# Patient Record
Sex: FEMALE | Race: BLACK OR AFRICAN AMERICAN | Employment: FULL TIME | URBAN - METROPOLITAN AREA
[De-identification: names, ages, dates, MRNs, and addresses within clinical notes are randomized per-mention and may not be internally consistent; named-entity substitution may affect disease eponyms.]

---

## 2016-06-07 LAB
ANTIBODY: NON REACTIVE
CHOLESTEROL, TOTAL: 139 MG/DL
CHOLESTEROL/HDL RATIO: 2.6
HDLC SERPL-MCNC: 54 MG/DL (ref 35–70)
LDL CHOLESTEROL CALCULATED: 71 MG/DL (ref 0–160)
TRIGL SERPL-MCNC: 70 MG/DL
VLDLC SERPL CALC-MCNC: NORMAL MG/DL

## 2016-11-17 LAB — HBA1C MFR BLD: 6.7 %

## 2017-01-04 ENCOUNTER — HOSPITAL ENCOUNTER (OUTPATIENT)
Dept: PHYSICAL THERAPY | Age: 57
Discharge: HOME | End: 2017-01-04
Admitting: NURSE PRACTITIONER

## 2017-01-06 ENCOUNTER — HOSPITAL ENCOUNTER (OUTPATIENT)
Dept: PHYSICAL THERAPY | Age: 57
Discharge: HOME | End: 2017-01-06
Admitting: NURSE PRACTITIONER

## 2017-01-10 ENCOUNTER — HOSPITAL ENCOUNTER (OUTPATIENT)
Dept: PHYSICAL THERAPY | Age: 57
Discharge: HOME | End: 2017-01-10
Admitting: NURSE PRACTITIONER

## 2017-01-11 DIAGNOSIS — I10 ESSENTIAL HYPERTENSION: Chronic | ICD-10-CM

## 2017-01-11 RX ORDER — LISINOPRIL 20 MG/1
TABLET ORAL
Qty: 180 TABLET | Refills: 0 | Status: SHIPPED | OUTPATIENT
Start: 2017-01-11 | End: 2017-04-17 | Stop reason: SDUPTHER

## 2017-01-17 ENCOUNTER — HOSPITAL ENCOUNTER (OUTPATIENT)
Dept: PHYSICAL THERAPY | Age: 57
Discharge: HOME | End: 2017-01-17
Admitting: NURSE PRACTITIONER

## 2017-01-19 ENCOUNTER — HOSPITAL ENCOUNTER (OUTPATIENT)
Dept: PHYSICAL THERAPY | Age: 57
Discharge: HOME | End: 2017-01-19
Admitting: NURSE PRACTITIONER

## 2017-01-24 ENCOUNTER — OFFICE VISIT (OUTPATIENT)
Dept: FAMILY MEDICINE CLINIC | Age: 57
End: 2017-01-24

## 2017-01-24 VITALS
OXYGEN SATURATION: 97 % | TEMPERATURE: 99.1 F | HEART RATE: 80 BPM | WEIGHT: 138 LBS | SYSTOLIC BLOOD PRESSURE: 120 MMHG | DIASTOLIC BLOOD PRESSURE: 78 MMHG | BODY MASS INDEX: 24.45 KG/M2 | HEIGHT: 63 IN

## 2017-01-24 DIAGNOSIS — N18.30 CKD (CHRONIC KIDNEY DISEASE) STAGE 3, GFR 30-59 ML/MIN (HCC): ICD-10-CM

## 2017-01-24 DIAGNOSIS — E78.00 PURE HYPERCHOLESTEROLEMIA: ICD-10-CM

## 2017-01-24 DIAGNOSIS — I10 ESSENTIAL HYPERTENSION: Primary | Chronic | ICD-10-CM

## 2017-01-24 DIAGNOSIS — N95.1 MENOPAUSAL STATE: ICD-10-CM

## 2017-01-24 DIAGNOSIS — Z23 NEEDS FLU SHOT: ICD-10-CM

## 2017-01-24 PROCEDURE — 90630 INFLUENZA, QUADRIVALENT, INTRADERMAL, PF (FLUZONE QUADRIVALENT PF ID): CPT | Performed by: FAMILY MEDICINE

## 2017-01-24 PROCEDURE — 99214 OFFICE O/P EST MOD 30 MIN: CPT | Performed by: FAMILY MEDICINE

## 2017-01-24 PROCEDURE — 90471 IMMUNIZATION ADMIN: CPT | Performed by: FAMILY MEDICINE

## 2017-01-25 ASSESSMENT — ENCOUNTER SYMPTOMS: COUGH: 0

## 2017-02-02 DIAGNOSIS — I10 ESSENTIAL HYPERTENSION: Chronic | ICD-10-CM

## 2017-02-02 RX ORDER — AMLODIPINE BESYLATE 5 MG/1
TABLET ORAL
Qty: 90 TABLET | Refills: 1 | Status: SHIPPED | OUTPATIENT
Start: 2017-02-02 | End: 2017-08-11 | Stop reason: DRUGHIGH

## 2017-03-20 RX ORDER — CARVEDILOL 12.5 MG/1
TABLET ORAL
Qty: 180 TABLET | Refills: 0 | Status: SHIPPED | OUTPATIENT
Start: 2017-03-20 | End: 2017-06-25 | Stop reason: SDUPTHER

## 2017-04-17 DIAGNOSIS — I10 ESSENTIAL HYPERTENSION: Chronic | ICD-10-CM

## 2017-04-17 RX ORDER — LISINOPRIL 20 MG/1
TABLET ORAL
Qty: 180 TABLET | Refills: 0 | Status: SHIPPED | OUTPATIENT
Start: 2017-04-17 | End: 2017-07-21 | Stop reason: SDUPTHER

## 2017-06-19 ENCOUNTER — OFFICE VISIT (OUTPATIENT)
Dept: FAMILY MEDICINE CLINIC | Age: 57
End: 2017-06-19

## 2017-06-19 VITALS
SYSTOLIC BLOOD PRESSURE: 110 MMHG | DIASTOLIC BLOOD PRESSURE: 70 MMHG | WEIGHT: 142 LBS | BODY MASS INDEX: 25.56 KG/M2 | TEMPERATURE: 99.6 F | HEART RATE: 78 BPM | OXYGEN SATURATION: 98 %

## 2017-06-19 DIAGNOSIS — J02.9 PHARYNGITIS, UNSPECIFIED ETIOLOGY: Primary | ICD-10-CM

## 2017-06-19 DIAGNOSIS — J01.10 ACUTE NON-RECURRENT FRONTAL SINUSITIS: ICD-10-CM

## 2017-06-19 LAB — S PYO AG THROAT QL: NORMAL

## 2017-06-19 PROCEDURE — 99214 OFFICE O/P EST MOD 30 MIN: CPT | Performed by: NURSE PRACTITIONER

## 2017-06-19 PROCEDURE — 87880 STREP A ASSAY W/OPTIC: CPT | Performed by: NURSE PRACTITIONER

## 2017-06-19 RX ORDER — AMOXICILLIN AND CLAVULANATE POTASSIUM 875; 125 MG/1; MG/1
1 TABLET, FILM COATED ORAL 2 TIMES DAILY
Qty: 20 TABLET | Refills: 0 | Status: SHIPPED | OUTPATIENT
Start: 2017-06-19 | End: 2017-06-29

## 2017-06-19 ASSESSMENT — ENCOUNTER SYMPTOMS
SHORTNESS OF BREATH: 1
SORE THROAT: 1
COUGH: 1

## 2017-06-26 RX ORDER — CARVEDILOL 12.5 MG/1
TABLET ORAL
Qty: 180 TABLET | Refills: 0 | Status: SHIPPED | OUTPATIENT
Start: 2017-06-26 | End: 2017-09-25 | Stop reason: SDUPTHER

## 2017-07-18 ENCOUNTER — TELEPHONE (OUTPATIENT)
Dept: FAMILY MEDICINE CLINIC | Age: 57
End: 2017-07-18

## 2017-07-18 DIAGNOSIS — G93.89 BRAIN MASS: Primary | ICD-10-CM

## 2017-08-11 ENCOUNTER — OFFICE VISIT (OUTPATIENT)
Dept: FAMILY MEDICINE CLINIC | Age: 57
End: 2017-08-11

## 2017-08-11 VITALS
SYSTOLIC BLOOD PRESSURE: 122 MMHG | HEART RATE: 75 BPM | WEIGHT: 142 LBS | HEIGHT: 63 IN | TEMPERATURE: 98.3 F | OXYGEN SATURATION: 97 % | BODY MASS INDEX: 25.16 KG/M2 | DIASTOLIC BLOOD PRESSURE: 76 MMHG

## 2017-08-11 DIAGNOSIS — I10 ESSENTIAL HYPERTENSION: Chronic | ICD-10-CM

## 2017-08-11 DIAGNOSIS — N18.30 CKD (CHRONIC KIDNEY DISEASE) STAGE 3, GFR 30-59 ML/MIN (HCC): ICD-10-CM

## 2017-08-11 DIAGNOSIS — E11.29 PERSISTENT PROTEINURIA ASSOCIATED WITH TYPE 2 DIABETES MELLITUS (HCC): Primary | ICD-10-CM

## 2017-08-11 DIAGNOSIS — R80.9 PERSISTENT PROTEINURIA ASSOCIATED WITH TYPE 2 DIABETES MELLITUS (HCC): Primary | ICD-10-CM

## 2017-08-11 DIAGNOSIS — M67.911 TENDINOPATHY OF RIGHT SHOULDER: ICD-10-CM

## 2017-08-11 LAB — HBA1C MFR BLD: 6.6 %

## 2017-08-11 PROCEDURE — 99214 OFFICE O/P EST MOD 30 MIN: CPT | Performed by: FAMILY MEDICINE

## 2017-08-11 PROCEDURE — 83036 HEMOGLOBIN GLYCOSYLATED A1C: CPT | Performed by: FAMILY MEDICINE

## 2017-08-11 RX ORDER — AMLODIPINE BESYLATE 10 MG/1
10 TABLET ORAL DAILY
COMMUNITY

## 2017-08-21 ENCOUNTER — HOSPITAL ENCOUNTER (OUTPATIENT)
Dept: PHYSICAL THERAPY | Age: 57
Discharge: HOME | End: 2017-08-31
Admitting: FAMILY MEDICINE

## 2017-08-23 ENCOUNTER — HOSPITAL ENCOUNTER (OUTPATIENT)
Dept: PHYSICAL THERAPY | Age: 57
Discharge: HOME | End: 2017-08-23
Admitting: FAMILY MEDICINE

## 2017-08-28 ENCOUNTER — HOSPITAL ENCOUNTER (OUTPATIENT)
Dept: PHYSICAL THERAPY | Age: 57
Discharge: HOME | End: 2017-08-28
Admitting: FAMILY MEDICINE

## 2017-08-30 ENCOUNTER — HOSPITAL ENCOUNTER (OUTPATIENT)
Dept: PHYSICAL THERAPY | Age: 57
Discharge: HOME | End: 2017-08-30
Admitting: FAMILY MEDICINE

## 2017-09-07 ENCOUNTER — OFFICE VISIT (OUTPATIENT)
Dept: ORTHOPEDIC SURGERY | Age: 57
End: 2017-09-07

## 2017-09-07 VITALS — WEIGHT: 142 LBS | BODY MASS INDEX: 25.16 KG/M2 | HEIGHT: 63 IN

## 2017-09-07 DIAGNOSIS — M25.511 CHRONIC RIGHT SHOULDER PAIN: Primary | ICD-10-CM

## 2017-09-07 DIAGNOSIS — M54.2 NECK PAIN: ICD-10-CM

## 2017-09-07 DIAGNOSIS — G89.29 CHRONIC RIGHT SHOULDER PAIN: Primary | ICD-10-CM

## 2017-09-07 PROCEDURE — 72020 X-RAY EXAM OF SPINE 1 VIEW: CPT | Performed by: ORTHOPAEDIC SURGERY

## 2017-09-07 PROCEDURE — 73030 X-RAY EXAM OF SHOULDER: CPT | Performed by: ORTHOPAEDIC SURGERY

## 2017-09-07 PROCEDURE — 99204 OFFICE O/P NEW MOD 45 MIN: CPT | Performed by: ORTHOPAEDIC SURGERY

## 2017-09-08 ENCOUNTER — HOSPITAL ENCOUNTER (OUTPATIENT)
Dept: PHYSICAL THERAPY | Age: 57
Discharge: HOME | End: 2017-09-08
Admitting: FAMILY MEDICINE

## 2017-09-14 ENCOUNTER — OFFICE VISIT (OUTPATIENT)
Dept: ORTHOPEDIC SURGERY | Age: 57
End: 2017-09-14

## 2017-09-14 VITALS — WEIGHT: 142 LBS | BODY MASS INDEX: 25.16 KG/M2 | HEIGHT: 63 IN

## 2017-09-14 DIAGNOSIS — G89.29 CHRONIC RIGHT SHOULDER PAIN: Primary | ICD-10-CM

## 2017-09-14 DIAGNOSIS — M25.511 CHRONIC RIGHT SHOULDER PAIN: Primary | ICD-10-CM

## 2017-09-14 PROCEDURE — 99213 OFFICE O/P EST LOW 20 MIN: CPT | Performed by: ORTHOPAEDIC SURGERY

## 2017-09-14 RX ORDER — PREDNISONE 10 MG/1
TABLET ORAL
Qty: 30 TABLET | Refills: 0 | Status: SHIPPED | OUTPATIENT
Start: 2017-09-14 | End: 2017-11-10 | Stop reason: ALTCHOICE

## 2017-09-25 RX ORDER — CARVEDILOL 12.5 MG/1
TABLET ORAL
Qty: 180 TABLET | Refills: 0 | Status: SHIPPED | OUTPATIENT
Start: 2017-09-25 | End: 2017-12-31 | Stop reason: SDUPTHER

## 2017-10-09 ENCOUNTER — TELEPHONE (OUTPATIENT)
Dept: FAMILY MEDICINE CLINIC | Age: 57
End: 2017-10-09
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
## 2017-10-13 ENCOUNTER — OFFICE VISIT (OUTPATIENT)
Dept: FAMILY MEDICINE CLINIC | Age: 57
End: 2017-10-13

## 2017-10-13 VITALS
HEART RATE: 75 BPM | OXYGEN SATURATION: 99 % | BODY MASS INDEX: 25.56 KG/M2 | DIASTOLIC BLOOD PRESSURE: 70 MMHG | WEIGHT: 142 LBS | SYSTOLIC BLOOD PRESSURE: 110 MMHG

## 2017-10-13 DIAGNOSIS — Z23 NEED FOR INFLUENZA VACCINATION: ICD-10-CM

## 2017-10-13 DIAGNOSIS — S06.0X0D CONCUSSION, WITHOUT LOC, SUBSEQUENT ENCOUNTER: Primary | ICD-10-CM
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
## 2017-10-13 PROCEDURE — 90471 IMMUNIZATION ADMIN: CPT | Performed by: FAMILY MEDICINE

## 2017-10-13 PROCEDURE — 90630 INFLUENZA, QUADV, 18-64 YRS, ID, PF, MICRO INJ, 0.1ML (FLUZONE QUADV, PF): CPT | Performed by: FAMILY MEDICINE

## 2017-10-13 PROCEDURE — 99214 OFFICE O/P EST MOD 30 MIN: CPT | Performed by: FAMILY MEDICINE

## 2017-10-13 ASSESSMENT — ENCOUNTER SYMPTOMS: VOMITING: 0
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
## 2017-10-22 DIAGNOSIS — I10 ESSENTIAL HYPERTENSION: Chronic | ICD-10-CM

## 2017-10-22 RX ORDER — LISINOPRIL 20 MG/1
TABLET ORAL
Qty: 180 TABLET | Refills: 0 | Status: SHIPPED | OUTPATIENT
Start: 2017-10-22 | End: 2018-01-20 | Stop reason: SDUPTHER

## 2017-11-07 ENCOUNTER — TELEPHONE (OUTPATIENT)
Dept: ORTHOPEDIC SURGERY | Age: 57
End: 2017-11-07

## 2017-11-09 DIAGNOSIS — M19.211 SECONDARY OSTEOARTHRITIS OF RIGHT SHOULDER: ICD-10-CM

## 2017-11-09 DIAGNOSIS — M75.41 IMPINGEMENT SYNDROME OF RIGHT SHOULDER: Primary | ICD-10-CM

## 2017-11-09 RX ORDER — OXYCODONE HYDROCHLORIDE 10 MG/1
10 TABLET ORAL EVERY 8 HOURS PRN
Qty: 40 TABLET | Refills: 0 | Status: SHIPPED | OUTPATIENT
Start: 2017-11-09 | End: 2017-11-16

## 2017-11-14 ENCOUNTER — HOSPITAL ENCOUNTER (OUTPATIENT)
Dept: SURGERY | Age: 57
Discharge: HOME | End: 2017-11-14
Attending: ORTHOPAEDIC SURGERY | Admitting: ORTHOPAEDIC SURGERY

## 2017-11-14 VITALS
RESPIRATION RATE: 16 BRPM | BODY MASS INDEX: 26.5 KG/M2 | WEIGHT: 144 LBS | SYSTOLIC BLOOD PRESSURE: 117 MMHG | HEIGHT: 62 IN | HEART RATE: 81 BPM | TEMPERATURE: 98 F | DIASTOLIC BLOOD PRESSURE: 80 MMHG | OXYGEN SATURATION: 100 %

## 2017-11-14 LAB
ANION GAP SERPL CALCULATED.3IONS-SCNC: 13 MMOL/L (ref 3–16)
BUN BLDV-MCNC: 33 MG/DL (ref 7–20)
CALCIUM SERPL-MCNC: 10 MG/DL (ref 8.3–10.6)
CHLORIDE BLD-SCNC: 102 MMOL/L (ref 99–110)
CO2: 24 MMOL/L (ref 21–32)
CREAT SERPL-MCNC: 1.8 MG/DL (ref 0.6–1.1)
GFR AFRICAN AMERICAN: 35
GFR NON-AFRICAN AMERICAN: 29
GLUCOSE BLD-MCNC: 125 MG/DL (ref 70–99)
GLUCOSE BLD-MCNC: 132 MG/DL (ref 70–99)
GLUCOSE BLD-MCNC: 162 MG/DL (ref 70–99)
HCT VFR BLD CALC: 27.2 % (ref 36–48)
HEMOGLOBIN: 8.6 G/DL (ref 12–16)
MCH RBC QN AUTO: 27.7 PG (ref 26–34)
MCHC RBC AUTO-ENTMCNC: 31.6 G/DL (ref 31–36)
MCV RBC AUTO: 87.8 FL (ref 80–100)
PDW BLD-RTO: 13.6 % (ref 12.4–15.4)
PERFORMED ON: ABNORMAL
PERFORMED ON: ABNORMAL
PLATELET # BLD: 166 K/UL (ref 135–450)
PMV BLD AUTO: 8.3 FL (ref 5–10.5)
POTASSIUM SERPL-SCNC: 5 MMOL/L (ref 3.5–5.1)
RBC # BLD: 3.1 M/UL (ref 4–5.2)
SODIUM BLD-SCNC: 139 MMOL/L (ref 136–145)
WBC # BLD: 6.3 K/UL (ref 4–11)

## 2017-11-14 RX ORDER — MEPERIDINE HYDROCHLORIDE 25 MG/ML
12.5 INJECTION INTRAMUSCULAR; INTRAVENOUS; SUBCUTANEOUS EVERY 5 MIN PRN
Status: DISCONTINUED | OUTPATIENT
Start: 2017-11-14 | End: 2017-11-15 | Stop reason: HOSPADM

## 2017-11-14 RX ORDER — CEPHALEXIN 500 MG/1
500 CAPSULE ORAL 3 TIMES DAILY
Qty: 21 CAPSULE | Refills: 1 | Status: SHIPPED | OUTPATIENT
Start: 2017-11-14 | End: 2017-11-21

## 2017-11-14 RX ORDER — ONDANSETRON 2 MG/ML
4 INJECTION INTRAMUSCULAR; INTRAVENOUS
Status: COMPLETED | OUTPATIENT
Start: 2017-11-14 | End: 2017-11-14

## 2017-11-14 RX ORDER — SODIUM CHLORIDE 0.9 % (FLUSH) 0.9 %
SYRINGE (ML) INJECTION
Status: DISPENSED
Start: 2017-11-14 | End: 2017-11-14

## 2017-11-14 RX ORDER — FENTANYL CITRATE 50 UG/ML
25 INJECTION, SOLUTION INTRAMUSCULAR; INTRAVENOUS EVERY 5 MIN PRN
Status: DISCONTINUED | OUTPATIENT
Start: 2017-11-14 | End: 2017-11-15 | Stop reason: HOSPADM

## 2017-11-14 RX ORDER — SODIUM CHLORIDE 9 MG/ML
INJECTION, SOLUTION INTRAVENOUS CONTINUOUS
Status: DISCONTINUED | OUTPATIENT
Start: 2017-11-14 | End: 2017-11-15 | Stop reason: HOSPADM

## 2017-11-14 RX ORDER — LABETALOL HYDROCHLORIDE 5 MG/ML
5 INJECTION, SOLUTION INTRAVENOUS EVERY 10 MIN PRN
Status: DISCONTINUED | OUTPATIENT
Start: 2017-11-14 | End: 2017-11-15 | Stop reason: HOSPADM

## 2017-11-14 RX ORDER — CEFAZOLIN SODIUM 2 G/100ML
2 INJECTION, SOLUTION INTRAVENOUS
Status: COMPLETED | OUTPATIENT
Start: 2017-11-14 | End: 2017-11-14

## 2017-11-14 RX ORDER — PROMETHAZINE HYDROCHLORIDE 25 MG/ML
6.25 INJECTION, SOLUTION INTRAMUSCULAR; INTRAVENOUS
Status: COMPLETED | OUTPATIENT
Start: 2017-11-14 | End: 2017-11-14

## 2017-11-14 RX ORDER — FENTANYL CITRATE 50 UG/ML
50 INJECTION, SOLUTION INTRAMUSCULAR; INTRAVENOUS EVERY 5 MIN PRN
Status: DISCONTINUED | OUTPATIENT
Start: 2017-11-14 | End: 2017-11-15 | Stop reason: HOSPADM

## 2017-11-14 RX ORDER — ACETAMINOPHEN 325 MG/1
650 TABLET ORAL EVERY 4 HOURS PRN
Status: DISCONTINUED | OUTPATIENT
Start: 2017-11-14 | End: 2017-11-15 | Stop reason: HOSPADM

## 2017-11-14 RX ADMIN — CEFAZOLIN SODIUM 2 G: 2 INJECTION, SOLUTION INTRAVENOUS at 07:01

## 2017-11-14 RX ADMIN — ONDANSETRON 4 MG: 2 INJECTION INTRAMUSCULAR; INTRAVENOUS at 09:19

## 2017-11-14 RX ADMIN — SODIUM CHLORIDE: 9 INJECTION, SOLUTION INTRAVENOUS at 06:59

## 2017-11-14 RX ADMIN — FENTANYL CITRATE 50 MCG: 50 INJECTION, SOLUTION INTRAMUSCULAR; INTRAVENOUS at 08:53

## 2017-11-14 RX ADMIN — FENTANYL CITRATE 25 MCG: 50 INJECTION, SOLUTION INTRAMUSCULAR; INTRAVENOUS at 09:14

## 2017-11-14 RX ADMIN — PROMETHAZINE HYDROCHLORIDE 6.25 MG: 25 INJECTION, SOLUTION INTRAMUSCULAR; INTRAVENOUS at 09:45

## 2017-11-14 ASSESSMENT — PAIN DESCRIPTION - PAIN TYPE
TYPE: SURGICAL PAIN

## 2017-11-14 ASSESSMENT — PAIN SCALES - GENERAL
PAINLEVEL_OUTOF10: 4
PAINLEVEL_OUTOF10: 6
PAINLEVEL_OUTOF10: 8

## 2017-11-14 ASSESSMENT — PAIN DESCRIPTION - ORIENTATION
ORIENTATION: RIGHT
ORIENTATION: RIGHT

## 2017-11-14 ASSESSMENT — PAIN DESCRIPTION - LOCATION
LOCATION: SHOULDER
LOCATION: SHOULDER

## 2017-11-14 ASSESSMENT — PAIN - FUNCTIONAL ASSESSMENT: PAIN_FUNCTIONAL_ASSESSMENT: 0-10

## 2017-11-14 NOTE — ANESTHESIA PRE-OP
BY MOUTH TWICE A DAY 17   Doris Fam MD   amLODIPine (NORVASC) 10 MG tablet Take 10 mg by mouth daily    Historical Provider, MD   Blood Pressure Monitoring QUARTERMAIN) MISC Use daily as needed 17   Doris Fam MD   Dulaglutide (TRULICITY) 8.39 DK/0.0GI SOPN Inject into the skin    Historical Provider, MD   atorvastatin (LIPITOR) 10 MG tablet Take 10 mg by mouth daily    Historical Provider, MD   vitamin D (CHOLECALCIFEROL) 1000 UNITS TABS tablet Take 1,000 Units by mouth daily    Historical Provider, MD   Insulin Glargine (TOUJEO SOLOSTAR) 300 UNIT/ML SOPN Inject 20 Units into the skin     Historical Provider, MD   SOFT TOUCH LANCETS MISC 1 Stick by Does not apply route 2 times daily as needed. 12   Doris Fam MD       Current Outpatient Prescriptions   Medication Sig Dispense Refill    oxyCODONE HCl (OXY-IR) 10 MG immediate release tablet Take 1 tablet by mouth every 8 hours as needed for Pain . 40 tablet 0    lisinopril (PRINIVIL;ZESTRIL) 20 MG tablet TAKE ONE TABLET BY MOUTH TWICE A  tablet 0    Prenatal Multivit-Min-Fe-FA (PRE- FORMULA PO) Take by mouth      carvedilol (COREG) 12.5 MG tablet TAKE ONE TABLET BY MOUTH TWICE A  tablet 0    amLODIPine (NORVASC) 10 MG tablet Take 10 mg by mouth daily      Blood Pressure Monitoring (SPHYGMOMANOMETER) MISC Use daily as needed 1 each 0    Dulaglutide (TRULICITY) 8.05 HL/7.7VT SOPN Inject into the skin      atorvastatin (LIPITOR) 10 MG tablet Take 10 mg by mouth daily      vitamin D (CHOLECALCIFEROL) 1000 UNITS TABS tablet Take 1,000 Units by mouth daily      Insulin Glargine (TOUJEO SOLOSTAR) 300 UNIT/ML SOPN Inject 20 Units into the skin       SOFT TOUCH LANCETS MISC 1 Stick by Does not apply route 2 times daily as needed.  100 each 5     Current Facility-Administered Medications   Medication Dose Route Frequency Provider Last Rate Last Dose    ceFAZolin (ANCEF) 2 g in dextrose 4 % 100 mL IVPB (premix)  2 g administered. Anesthetic plan and risks discussed with patient. Plan discussed with CRNA. DOS STAFF ADDENDUM:    Pt seen and examined, chart reviewed (including anesthesia, drug and allergy history). No interval changes to history and physical examination. Anesthetic plan, risks, benefits, alternatives, and personnel involved discussed with patient. Patient verbalized an understanding and agrees to proceed.       Oleg Umanzor MD  November 14, 2017  6:45 AM

## 2017-11-14 NOTE — PROGRESS NOTES
Received from or - unresponsive,oral airway,nasal cannula,right shoulder dressing dry and intact,circulation good,ice pack placed,elevated,jeri,maria isabel lyon,vss.

## 2017-11-14 NOTE — OP NOTE
put on the operative table in the supine position. Following the patient's general anesthesia. The patient was sat up in the beachchair positioner. A pillow was put under the knees and taped in place the flank pads were tightened and a nonoperative arm pillow was applied. The neck and head positioner was carefully adjusted and tightened for optimal safety. At this point the operative arm was scrubbed with alcohol and Betadine and injected with 30 mL of Marcaine and 30 mL of lidocaine lidocaine did have epinephrine. I now performed a full physical exam of the shoulder under anesthesia for range of motion internal rotation, external rotation, forward flexion, I also tested anterior and posterior glide I tested for sulcus sign bilaterally and I also tested for instability. Following this the entire upper extremity was scrubbed with DuraPrep and draped in a sterile manner. The arm was hooked up to [facility] and with gentle traction a posterior portal was placed using a sharp scalpel and a blunt trocar entering the joint without any difficulty. With careful visualization of the entire intra-articular joint a anterior portal was made with an 18-gauge spinal needle sharp scalpel and a blunt trocar. The probe was used to probe all of the intra-articular pathology including the labrum, the long head of the biceps, the rotator cuff, the glenohumeral joint, the inferior recess, and the chondral surface. After the initial diagnostic arthroscopy I went ahead and used the shaver and the bipolar through the anterior portal to remove hypertrophic synovitis, labral fraying, and undersurface rotator cuff fraying. The synovitis was removed from the inferior recess posterior to the labrum superior to the labrum and anterior to the labrum. The labral tissue was smoothed off with a shaver and a bipolar both posterior superiorly and anteriorly.   The undersurface rotator cuff fraying was removed 1st with a shaver then the edges were smoothed off with the bipolar. Any chondral surface fraying was removed either with the shaver, bipolar or probe. Following my standard diagnostic arthroscopy the cannula was removed from the glenohumeral joint. The blunt trocar was inserted into the cannula and through the posterior portal the cannula was entered into the subacromial space without any difficulty. Now under direct visualization we could see there was moderate bursitis, synovitis, a subacromial spur and distal clavicle spur. Under direct visualization I put a 18-gauge spinal needle laterally through the deltoid into the subacromial space, liking this location I made my portal with a sharp scalpel and a blunt trocar. At this point I entered the subacromial space with a shaver and I removed all the bursa tissue synovial tissue and tissue lining the subacromial joint space and also the bone spur and around the distal clavicle. I used the bipolar to remove all soft tissue from the undersurface bone spur and the distal clavicle spur. She also had a meso- acromion that I did excise it was anterior of the anterior portion of the clavicle and did not affect any stability. I removed the bipolar tissue ablator and entered with the 5.5 mm  barrel bur and proceeded to perform a generous subacromial decompression through the lateral portal and through the posterior portal.      Next I went ahead and addressed the distal clavicle with a shaver and a bipolar through the lateral portal and then resecting 5 mm to the level of the subacromial decompression. I also used the shaver the bipolar and the 5.5 mm barrel bur to undermine the distal clavicle to make sure there was no impingement on the rotator cuff tissue.   I now removed the 5.5 mm barrel bur from the lateral portal and I made an anterior portal with a blunt trocar and entered with a shaver then a bipolar and I finished off the distal

## 2017-11-17 NOTE — ANESTHESIA POST-OP
Anesthesia Post-op Note    Patient: Milly Dean  MRN: [id number]  YOB: 1960  Date of evaluation: 11/17/2017  Time:  8:36 AM     Procedure(s) Performed:     Last Vitals: /80   Pulse 81   Temp 98 °F (36.7 °C) (Temporal)   Resp 16   Ht 5' 2\" (1.575 m)   Wt 144 lb (65.3 kg)   LMP 01/02/2011 (LMP Unknown)   SpO2 100%   BMI 26.34 kg/m²     Skylar Phase I: Skylar Score: 10    Skylar Phase II: Skylar Score: 10    Anesthesia Post Evaluation    Final anesthesia type: general  Patient location during evaluation: PACU  Patient participation: complete - patient participated  Level of consciousness: awake and alert  Airway patency: patent  Nausea & Vomiting: no nausea and no vomiting  Complications: no  Cardiovascular status: hemodynamically stable  Respiratory status: acceptable  Hydration status: euvolemic        Stephanie Townsend MD  8:36 AM

## 2017-11-20 ENCOUNTER — HOSPITAL ENCOUNTER (OUTPATIENT)
Dept: PHYSICAL THERAPY | Age: 57
Discharge: HOME | End: 2017-11-30
Admitting: ORTHOPAEDIC SURGERY

## 2017-11-20 ENCOUNTER — OFFICE VISIT (OUTPATIENT)
Dept: ORTHOPEDIC SURGERY | Age: 57
End: 2017-11-20

## 2017-11-20 DIAGNOSIS — M75.41 SUBACROMIAL IMPINGEMENT, RIGHT: Primary | ICD-10-CM

## 2017-11-20 PROCEDURE — 99024 POSTOP FOLLOW-UP VISIT: CPT | Performed by: ORTHOPAEDIC SURGERY

## 2017-11-20 NOTE — PROGRESS NOTES
HISTORY OF PRESENT ILLNESS:   S/P Knee Arthroscopy  The Patient returns today for their postoperative visit after *** knee arthroscopy. Pain control has been satisfactory with oral medications. There have been no fevers or chills. PHYSICAL EXAMINATION: *** Knee   Inspection reveals expected swelling. Portal sites are clean and dry. The skin is warm. Range of motion is limited by pain and swelling. There is no calf pain  Homans sign is negative. Examination of the contralateral knee reveals warm skin, range of motion within normal limits, good quadriceps bulk, tone and strength, no tenderness to palpation, stable cruciate and collateral ligaments, and no joint line tenderness. Examination of the Patients Lumbar spine revealsThe skin is warm and dry. There is no swelling, warmth, or erythema. Range of motion is within normal limits. There is no paraspinal or spinous process tenderness. Ipsilateral and contralateral straight leg raising tests are negative. The distal neurovascular exam is grossly intact and symmetric. ASSESSMENT/PLAN:   The patient is doing well after knee arthroscopy. I have recommended ice,judicious use of NSAIDs, and physical therapy to diminish swelling and restore both motion and strength. I cautioned against overusing the knee, and they will schedule a reevaluation in ***  They verbalized good understanding of the plan.

## 2017-11-20 NOTE — PLAN OF CARE
than 40 percent impaired, limited or restricted  Mobility: Walking and Moving Around Goal Status (): At least 1 percent but less than 20 percent impaired, limited or restricted    Pain Scale: 7/10  Easing factors: extra strength Tylenol   Provocative factors: reaching overhead, lifting objects     Type: []Constant   [x]Intermittent  [x]radiating into neck  []Localized []other:     Numbness/Tingling: Denies     Occupation/School:     Living Status/Prior Level of Function: Independent with ADLs and IADLs    OBJECTIVE:     CERV ROM     Cervical Flexion WNL    Cervical Extension WNL    Cervical SB WNL complaint of stiffness    Cervical rotation WNL         ROM Left Right   Shoulder Flex 175 48   Shoulder Abd 172 45   Shoulder ER 88 NT   Shoulder IR 90 NT                  Strength  Left Right   Shoulder Flex 4+ 3   Shoulder Scap 4+ 3   Shoulder ER 4+ 3   Shoulder IR 4+ 3               Reflexes/Sensation:    [x]Dermatomes/Myotomes intact    [x]Reflexes equal and normal bilaterally   []Other:    Joint mobility: empty end feel    []Normal    []Hypo   []Hyper    Palpation: tender to palpation R posterior RTC, biceps long head tendon and muscle belly, anterior and middle deltoid     Functional Mobility/Transfers: WNL    Posture: WNL    Bandages/Dressings/Incisions:Dry, well healing incision     Gait: (include devices/WB status): WNL    Orthopedic Special Tests: NT                        [x] Patient history, allergies, meds reviewed. Medical chart reviewed. See intake form. Review Of Systems (ROS):  [x]Performed Review of systems (Integumentary, CardioPulmonary, Neurological) by intake and observation. Intake form has been scanned into medical record. Patient has been instructed to contact their primary care physician regarding ROS issues if not already being addressed at this time.       Co-morbidities/Complexities (which will affect course of rehabilitation):   []None           Arthritic conditions   []Rheumatoid functional ROM   [x]Decreased UE functional strength   [x]Abnormal reflexes/sensation/myotomal/dermatomal deficits   [x]Decreased RC/scapular/core strength and neuromuscular control   []other:      Functional Activity Limitations (from functional questionnaire and intake)   []Reduced ability to tolerate prolonged functional positions   []Reduced ability or difficulty with changes of positions or transfers between positions   []Reduced ability to maintain good posture and demonstrate good body mechanics with sitting, bending, and lifting   [] Reduced ability or tolerance with driving and/or computer work   []Reduced ability to sleep   [x]Reduced ability to perform lifting, reaching, carrying tasks   [x]Reduced ability to tolerate impact through UE   [x]Reduced ability to reach behind back   []Reduced ability to  or hold objects   []Reduced ability to throw or toss an object   []other:    Participation Restrictions   []Reduced participation in self care activities   [x]Reduced participation in home management activities   []Reduced participation in work activities   [x]Reduced participation in social activities. [x]Reduced participation in sport/recreation activities. Classification:   [x]Signs/symptoms consistent with post-surgical status including decreased ROM, strength and function.   []Signs/symptoms consistent with joint sprain/strain   []Signs/symptoms consistent with shoulder impingement   []Signs/symptoms consistent with shoulder/elbow/wrist tendinopathy   []Signs/symptoms consistent with Rotator cuff tear   []Signs/symptoms consistent with labral tear   []Signs/symptoms consistent with postural dysfunction    []Signs/symptoms consistent with Glenohumeral IR Deficit - <45 degrees   []Signs/symptoms consistent with facet dysfunction of cervical/thoracic spine    []Signs/symptoms consistent with pathology which may benefit from Dry needling     []other:     Prognosis/Rehab Potential: []Excellent   [x]Good    []Fair   []Poor    Tolerance of evaluation/treatment:    []Excellent   [x]Good    []Fair   []Poor    Physical Therapy Evaluation Complexity Justification  [] A history of present problem with:  [] no personal factors and/or comorbidities that impact the plan of care;  []1-2 personal factors and/or comorbidities that impact the plan of care  [x]3 personal factors and/or comorbidities that impact the plan of care  [] An examination of body systems using standardized tests and measures addressing any of the following: body structures and functions (impairments), activity limitations, and/or participation restrictions;:  [x] a total of 1-2 or more elements   [] a total of 3 or more elements   [] a total of 4 or more elements   [] A clinical presentation with:  [x] stable and/or uncomplicated characteristics   [] evolving clinical presentation with changing characteristics  [] unstable and unpredictable characteristics;   [] Clinical decision making of [x] low, [] moderate, [] high complexity using standardized patient assessment instrument and/or measurable assessment of functional outcome. [x] EVAL (LOW) 11591 (typically 30 minutes face-to-face)  [] EVAL (MOD) 40529 (typically 30 minutes face-to-face)  [] EVAL (HIGH) 85226 (typically 45 minutes face-to-face)  [] RE-EVAL     PLAN:  Frequency/Duration:  1-2 days per week for 10-12 Weeks:  INTERVENTIONS:  [x] Therapeutic exercise including: strength training, ROM, for Upper extremity and core   [x]  NMR activation and proprioception for UE, scap and Core   [x] Manual therapy as indicated for shoulder, scapula and spine to include: Dry Needling/IASTM, STM, PROM, Gr I-IV mobilizations, manipulation. [x] Modalities as needed that may include: thermal agents, E-stim, Biofeedback, US, iontophoresis as indicated  [x] Patient education on joint protection, postural re-education, activity modification, progression of HEP.     HEP instruction: table slides, supine cane Er, 4-way isometrics     GOALS:  Patient stated goal: \"elevate R arm\"    Therapist goals for Patient:   Short Term Goals: To be achieved in: 2 weeks  1. Independent in HEP and progression per patient tolerance, in order to prevent re-injury. 2. Patient will have a decrease in pain to facilitate improvement in movement, function, and ADLs as indicated by Functional Deficits. Long Term Goals: To be achieved in: 12 weeks  1. Disability index score of 25% or less for the DASH to assist with reaching prior level of function. 2. Patient will demonstrate increased AROM to WNL to allow for proper joint functioning as indicated by patients Functional Deficits. 3. Patient will demonstrate an increase in Strength to  5/5 to allow for proper functional mobility as indicated by patients Functional Deficits. 4. Patient will return to all functional activities without increased symptoms or restriction. Electronically signed by:   Dorinda Tellez PT

## 2017-11-20 NOTE — PROGRESS NOTES
History of Present of Illness:  S/P Shoulder Arthroscopy   The patient returns today for right shoulder evaluation 1 week after shoulder arthroscopy. Examination:  Inspection reveals warm, dry, intact skin. There is no adenopathy. The distal neurovascular exam is grossly intact. Examination of the contralateral shoulder reveals no atrophy or deformity. The skin is warm and dry. Range of motion is within normal limits. There is no focal tenderness with palpation. Provocative SLAP, biceps tension, apprehension AC joint or rotator cuff tests are negative. Strength is graded 5/5 in all muscle groups. The distal neurovascular exam is grossly intact. Cervical spine: The skin is warm and dry. There is no swelling, warmth, or erythema. Range of motion is within normal limits. There is no paraspinal or spinous process tenderness. Spurling's sign is negative and did not produce shoulder pain. The distal neurovascular exam is grossly intact. Diagnostic Test Findings:    No orders of the defined types were placed in this encounter.      Treatment Plan:  Start physical therapy follow-up in 4-5 weeks

## 2017-11-20 NOTE — FLOWSHEET NOTE
[address], [facility]    Physical Therapy Daily Treatment Note  Date:  2017    Patient Name:  Bucky Gaston    :  1960  MRN: [id number]  Restrictions/Precautions:    Medical/Treatment Diagnosis Information:  Diagnosis: M75.41 (R subacromial impingement)  Treatment Diagnosis: M75.41 (R subacromial impingement)  Insurance/Certification information:  PT Insurance Information: UMR/no limit   Physician Information:  Referring Practitioner: Dr. Poe Race of care signed (Y/N):     Date of Patient follow up with Physician:     G-Code (if applicable):      Date G-Code Applied:    PT G-Codes  Functional Assessment Tool Used: quickDASH  Score: 42  Functional Limitation: Carrying, moving and handling objects  Mobility: Walking and Moving Around Current Status (): At least 20 percent but less than 40 percent impaired, limited or restricted  Mobility: Walking and Moving Around Goal Status ():  At least 1 percent but less than 20 percent impaired, limited or restricted    Progress Note: [x]  Yes  []  No  Next due by: Visit #10      Latex Allergy:  [x]NO      []YES  Preferred Language for Healthcare:   [x]English       []other:    Visit # Insurance Allowable   1 Limit      Pain level:  7/10     SUBJECTIVE:  See eval    OBJECTIVE: See eval  Observation:   Test measurements:      RESTRICTIONS/PRECAUTIONS:  R SAD, DCE, labral debridement DOS 17    Exercises/Interventions:   Therapeutic Ex Wt / Resistance Sets/sec Reps Notes   UBE       Cane AAROM flex/press/ER  2 15    Table slides   2 15    4 way Isomet  2 15 s    T- band Row/pinch       T- band lower pinch       T- band ER activation       Supine SA punch       SL ER       Prone Rows/HAB/ext       Seat Table slides/Wall Slides       Seated HH  Depression       No Money       Standing flex/scap       Standing Punch              Pendulums   x25                          Manual Intervention Shld /GH Mobs  15 min     Post Cap mobs       Thoracic/Rib manipualtion       CT MT/Mobs       PROM MT                     NMR re-education       T-spine Ext       GH depres/compress       Kierra Scap Bio       Scap/GH NMR       Body blade       Wall ball roll       Wall Ball bounce                  Therapeutic Exercise and NMR EXR  [x] (56769) Provided verbal/tactile cueing for activities related to strengthening, flexibility, endurance, ROM  for improvements in scapular, scapulothoracic and UE control with self care, reaching, carrying, lifting, house/yardwork, driving/computer work.    [] (06833) Provided verbal/tactile cueing for activities related to improving balance, coordination, kinesthetic sense, posture, motor skill, proprioception  to assist with  scapular, scapulothoracic and UE control with self care, reaching, carrying, lifting, house/yardwork, driving/computer work. Therapeutic Activities:    [] (16176 or 87606) Provided verbal/tactile cueing for activities related to improving balance, coordination, kinesthetic sense, posture, motor skill, proprioception and motor activation to allow for proper function of scapular, scapulothoracic and UE control with self care, carrying, lifting, driving/computer work.      Home Exercise Program:    [x] (02773) Reviewed/Progressed HEP activities related to strengthening, flexibility, endurance, ROM of scapular, scapulothoracic and UE control with self care, reaching, carrying, lifting, house/yardwork, driving/computer work  [] (23613) Reviewed/Progressed HEP activities related to improving balance, coordination, kinesthetic sense, posture, motor skill, proprioception of scapular, scapulothoracic and UE control with self care, reaching, carrying, lifting, house/yardwork, driving/computer work      Manual Treatments:  PROM / STM / Oscillations-Mobs:  G-I, II, III, IV (PA's, Inf., Post.)  [x] (05253) Provided manual therapy to mobilize soft tissue/joints of Patient limited by fatique  [] Patient limited by pain  [] Patient limited by other medical complications  [] Other:     Prognosis: [x] Good [] Fair  [] Poor    Patient Requires Follow-up: [x] Yes  [] No    PLAN: See eval  [] Continue per plan of care [] Alter current plan (see comments)  [x] Plan of care initiated [] Hold pending MD visit [] Discharge    Electronically signed by:  Teodoro Cassidy PT, DPT

## 2017-11-22 ENCOUNTER — HOSPITAL ENCOUNTER (OUTPATIENT)
Dept: PHYSICAL THERAPY | Age: 57
Discharge: HOME | End: 2017-11-22
Admitting: ORTHOPAEDIC SURGERY

## 2017-11-22 DIAGNOSIS — M54.42 CHRONIC LEFT-SIDED LOW BACK PAIN WITH LEFT-SIDED SCIATICA: Primary | ICD-10-CM

## 2017-11-22 DIAGNOSIS — G89.29 CHRONIC LEFT-SIDED LOW BACK PAIN WITH LEFT-SIDED SCIATICA: Primary | ICD-10-CM

## 2017-11-22 PROCEDURE — MISC903 COLD PAC CERVICAL: Performed by: ORTHOPAEDIC SURGERY

## 2017-11-22 NOTE — FLOWSHEET NOTE
[place] , [employer]    Physical Therapy Daily Treatment Note  Date:  2017    Patient Name:  Pritesh Laura    :  1960  MRN: [id number]  Restrictions/Precautions:    Medical/Treatment Diagnosis Information:  Diagnosis: M75.41 (R subacromial impingement)  Treatment Diagnosis: M75.41 (R subacromial impingement)   R SAD, DCE, labral debridement DOS   Insurance/Certification information:  PT Insurance Information: UMR/no limit   Physician Information:  Referring Practitioner: Dr. Rosen Fear of care signed (Y/N):     Date of Patient follow up with Physician:     G-Code (if applicable):      Date G-Code Applied:         Progress Note: [x]  Yes  []  No  Next due by: Visit #10      Latex Allergy:  [x]NO      []YES  Preferred Language for Healthcare:   [x]English       []other:    Visit # Insurance Allowable   2 Limit      Pain level:  5/10     SUBJECTIVE:  Pt reports her shoulder is achy, but the pain is much more controlled.      OBJECTIVE: See eval  Observation:   Test measurements:      RESTRICTIONS/PRECAUTIONS:  R SAD, DCE, labral debridement DOS 17    Exercises/Interventions:   Therapeutic Ex Wt / Resistance Sets/sec Reps Notes   UBE       Cane AAROM flex/press/ER  2 15    Table slides   2 15    4 way Isomet  2 15 s    AAROM shoulder flex   x10    T- band Row/pinch       T- band lower pinch       T- band ER activation       Scap squeezes  2 10    Supine SA punch       SL ER       Prone Rows/HAB/ext       Seat Table slides/Wall Slides       Seated HH  Depression       No Money       Standing flex/scap       Standing Punch              Pendulums   x25     Upper trap stretch   3 30s                  Manual Intervention       Shld /GH Mobs  25 min     Post Cap mobs       Thoracic/Rib manipualtion       CT MT/Mobs       PROM MT                     NMR re-education       T-spine Ext       GH depres/compress       Kierra Scap Bio

## 2017-11-27 ENCOUNTER — HOSPITAL ENCOUNTER (OUTPATIENT)
Dept: PHYSICAL THERAPY | Age: 57
Discharge: HOME | End: 2017-11-27
Admitting: ORTHOPAEDIC SURGERY

## 2017-11-29 ENCOUNTER — HOSPITAL ENCOUNTER (OUTPATIENT)
Dept: PHYSICAL THERAPY | Age: 57
Discharge: HOME | End: 2017-11-29
Admitting: ORTHOPAEDIC SURGERY

## 2017-11-29 NOTE — FLOWSHEET NOTE
[place] , [facility]    Physical Therapy Daily Treatment Note  Date:  2017    Patient Name:  Jazlyn Reyes    :  1960  MRN: [id number]  Restrictions/Precautions:    Medical/Treatment Diagnosis Information:  Diagnosis: M75.41 (R subacromial impingement)  Treatment Diagnosis: M75.41 (R subacromial impingement)   R SAD, DCE, labral debridement DOS   Insurance/Certification information:  PT Insurance Information: UMR/no limit   Physician Information:  Referring Practitioner: Dr. Eduardo Appl of care signed (Y/N):     Date of Patient follow up with Physician:     G-Code (if applicable):      Date G-Code Applied:         Progress Note: [x]  Yes  []  No  Next due by: Visit #10      Latex Allergy:  [x]NO      []YES  Preferred Language for Healthcare:   [x]English       []other:    Visit # Insurance Allowable   4 Limit      Pain level:  5/10     SUBJECTIVE:  Pt reports she feels she can do a lot more with the shoulder. Pt reports a knot in the top of her shoulder.       OBJECTIVE: See eval  Observation:   Test measurements:      RESTRICTIONS/PRECAUTIONS:  R SAD, DCE, labral debridement DOS 17    Exercises/Interventions:   Therapeutic Ex Wt / Resistance Sets/sec Reps Notes   Pulleys  6 min     Cane AAROM flex/press/ER  2 15    Table slides   2 15 Flex/scap   Wedge ER stretch   5 15 s    4 way Isomet  2 15 s    AAROM shoulder flex   x10    T- band Row/pinch Y 2 10    T- band lower pinch       T- band ER activation       Scap squeezes  2 10    Supine SA punch  2 10    SL ER       Prone Rows/HAB/ext       Seat Table slides/Wall Slides       Seated HH  Depression       No Money       Standing flex/scap       Standing Punch              Pendulums   x25     Upper trap stretch   3 30s                  Manual Intervention       Shld /GH Mobs  25 min     Post Cap mobs       Thoracic/Rib manipualtion       CT MT/Mobs       PROM MT soft tissue swelling/inflammation/restriction, improving soft tissue extensibility and allowing for proper ROM for normal function with self care, reaching, carrying, lifting, house/yardwork, driving/computer work    Modalities: ice x10 min    Charges:  Timed Code Treatment Minutes: 55 min   Total Treatment Minutes: 55 min      [] EVAL  [x] XT(24020) x  2   [] IONTO  [] NMR (04163) x      [] VASO  [x] Manual (68250) x  2    [] Other:  [] TA x       [] Mech Traction (79013)  [] ES(attended) (14045)      [] ES (un) (73321):     GOALS:  Patient stated goal: \"elevate R arm\"     Therapist goals for Patient:   Short Term Goals: To be achieved in: 2 weeks  1. Independent in HEP and progression per patient tolerance, in order to prevent re-injury. 2. Patient will have a decrease in pain to facilitate improvement in movement, function, and ADLs as indicated by Functional Deficits.     Long Term Goals: To be achieved in: 12 weeks  1. Disability index score of 25% or less for the DASH to assist with reaching prior level of function. 2. Patient will demonstrate increased AROM to WNL to allow for proper joint functioning as indicated by patients Functional Deficits. 3. Patient will demonstrate an increase in Strength to  5/5 to allow for proper functional mobility as indicated by patients Functional Deficits. 4. Patient will return to all functional activities without increased symptoms or restriction. Progression Towards Functional goals:  [] Patient is progressing as expected towards functional goals listed. [] Progression is slowed due to complexities listed. [] Progression has been slowed due to co-morbidities.   [x] Plan just implemented, too soon to assess goals progression  [] Other:     ASSESSMENT:      Treatment/Activity Tolerance:  [x] Patient tolerated treatment well [] Patient limited by fatique  [] Patient limited by pain  [] Patient limited by other medical complications  [] Other: Prognosis: [x] Good [] Fair  [] Poor    Patient Requires Follow-up: [x] Yes  [] No    PLAN: Progress ROM and start shoulder strength NPV   [] Continue per plan of care [] Alter current plan (see comments)  [x] Plan of care initiated [] Hold pending MD visit [] Discharge    Electronically signed by:  Teodoro Cassidy PT, DPT

## 2017-12-01 ENCOUNTER — HOSPITAL ENCOUNTER (OUTPATIENT)
Dept: PHYSICAL THERAPY | Age: 57
Discharge: HOME | End: 2017-12-31
Attending: ORTHOPAEDIC SURGERY | Admitting: ORTHOPAEDIC SURGERY

## 2017-12-04 ENCOUNTER — HOSPITAL ENCOUNTER (OUTPATIENT)
Dept: PHYSICAL THERAPY | Age: 57
Discharge: HOME | End: 2017-12-04
Admitting: ORTHOPAEDIC SURGERY

## 2017-12-06 ENCOUNTER — HOSPITAL ENCOUNTER (OUTPATIENT)
Dept: PHYSICAL THERAPY | Age: 57
Discharge: HOME | End: 2017-12-06
Admitting: ORTHOPAEDIC SURGERY

## 2017-12-06 NOTE — FLOWSHEET NOTE
MT/Mobs       PROM MT                     NMR re-education       T-spine Ext       GH depres/compress       Kierra Scap Bio       Scap/GH NMR       Body blade       Wall ball roll       Wall Ball bounce                  Therapeutic Exercise and NMR EXR  [x] (67834) Provided verbal/tactile cueing for activities related to strengthening, flexibility, endurance, ROM  for improvements in scapular, scapulothoracic and UE control with self care, reaching, carrying, lifting, house/yardwork, driving/computer work.    [] (27591) Provided verbal/tactile cueing for activities related to improving balance, coordination, kinesthetic sense, posture, motor skill, proprioception  to assist with  scapular, scapulothoracic and UE control with self care, reaching, carrying, lifting, house/yardwork, driving/computer work. Therapeutic Activities:    [] (31847 or 12358) Provided verbal/tactile cueing for activities related to improving balance, coordination, kinesthetic sense, posture, motor skill, proprioception and motor activation to allow for proper function of scapular, scapulothoracic and UE control with self care, carrying, lifting, driving/computer work.      Home Exercise Program:    [x] (91553) Reviewed/Progressed HEP activities related to strengthening, flexibility, endurance, ROM of scapular, scapulothoracic and UE control with self care, reaching, carrying, lifting, house/yardwork, driving/computer work  [] (63747) Reviewed/Progressed HEP activities related to improving balance, coordination, kinesthetic sense, posture, motor skill, proprioception of scapular, scapulothoracic and UE control with self care, reaching, carrying, lifting, house/yardwork, driving/computer work      Manual Treatments:  PROM / STM / Oscillations-Mobs:  G-I, II, III, IV (PA's, Inf., Post.)  [x] (64631) Provided manual therapy to mobilize soft tissue/joints of cervical/CT, scapular GHJ and UE for the purpose of modulating pain, promoting relaxation, complications  [] Other:     Prognosis: [x] Good [] Fair  [] Poor    Patient Requires Follow-up: [x] Yes  [] No    PLAN: Progress shoulder strength as tolerated   [] Continue per plan of care [] Alter current plan (see comments)  [x] Plan of care initiated [] Hold pending MD visit [] Discharge    Electronically signed by:  Teodoro Cassidy PT, DPT

## 2017-12-12 ENCOUNTER — HOSPITAL ENCOUNTER (OUTPATIENT)
Dept: PHYSICAL THERAPY | Age: 57
Discharge: HOME | End: 2017-12-12
Admitting: ORTHOPAEDIC SURGERY

## 2017-12-12 NOTE — FLOWSHEET NOTE
[place] [address], [facility]    Physical Therapy Daily Treatment Note  Date:  2017    Patient Name:  Jesus Eckert    :  1960  MRN: [id number]  Restrictions/Precautions:    Medical/Treatment Diagnosis Information:  Diagnosis: M75.41 (R subacromial impingement)  Treatment Diagnosis: M75.41 (R subacromial impingement)   R SAD, DCE, labral debridement DOS /  Insurance/Certification information:  PT Insurance Information: UMR/no limit   Physician Information:  Referring Practitioner: Dr. Papo Chahal of care signed (Y/N):     Date of Patient follow up with Physician:     G-Code (if applicable):      Date G-Code Applied:         Progress Note: [x]  Yes  []  No  Next due by: Visit #10      Latex Allergy:  [x]NO      []YES  Preferred Language for Healthcare:   [x]English       []other:    Visit # Insurance Allowable   7 Limit      Pain level:  5/10     SUBJECTIVE: Pt reports shoulder feels good, less swelling with more movement.  Pt continues to have difficulty reaching across body       OBJECTIVE: See eval  Observation:   Test measurements:      RESTRICTIONS/PRECAUTIONS:  R SAD, DCE, labral debridement DOS 17    Exercises/Interventions:   Therapeutic Ex Wt / Resistance Sets/sec Reps Notes   Pulleys  6 min     Cane AAROM flex/press/ER 1# 2 15    Hammock stretch   3 25 s       2 15 Flex/scap   Wedge ER stretch   5 15 s      2 15 s HEP   TRX walkouts    x10 Flex/abd   AAROM shoulder flex   x10    T- band Row/pinch R 2 10    T- band ER/IR Y/R 2 10           Scap squeezes  2 10    Supine SA punch 2# 1 15    SL ER 2# 2 10    Prone Rows/HAB/ext    NPV    Supine flex       Seated HH  Depression       No Money    NPV   Standing flex/scap       Standing Punch              Pendulums   x25     Upper trap stretch   3 30s                  Manual Intervention       Shld /GH Mobs  25 min  scap/shoulder mobs   Post Cap mobs       Thoracic/Rib manipualtion relaxation,  increasing ROM, reducing/eliminating soft tissue swelling/inflammation/restriction, improving soft tissue extensibility and allowing for proper ROM for normal function with self care, reaching, carrying, lifting, house/yardwork, driving/computer work    Modalities: ice x10 min    Charges:  Timed Code Treatment Minutes: 55 min   Total Treatment Minutes: 55 min      [] EVAL  [x] JE(90730) x  2   [] IONTO  [] NMR (02109) x      [] VASO  [x] Manual (61072) x  2    [] Other:  [] TA x       [] Mech Traction (32428)  [] ES(attended) (53826)      [] ES (un) (59690):     GOALS:  Patient stated goal: \"elevate R arm\"     Therapist goals for Patient:   Short Term Goals: To be achieved in: 2 weeks  1. Independent in HEP and progression per patient tolerance, in order to prevent re-injury. 2. Patient will have a decrease in pain to facilitate improvement in movement, function, and ADLs as indicated by Functional Deficits.     Long Term Goals: To be achieved in: 12 weeks  1. Disability index score of 25% or less for the DASH to assist with reaching prior level of function. 2. Patient will demonstrate increased AROM to WNL to allow for proper joint functioning as indicated by patients Functional Deficits. 3. Patient will demonstrate an increase in Strength to  5/5 to allow for proper functional mobility as indicated by patients Functional Deficits. 4. Patient will return to all functional activities without increased symptoms or restriction. Progression Towards Functional goals:  [] Patient is progressing as expected towards functional goals listed. [] Progression is slowed due to complexities listed. [] Progression has been slowed due to co-morbidities.   [x] Plan just implemented, too soon to assess goals progression  [] Other:     ASSESSMENT:      Treatment/Activity Tolerance:  [x] Patient tolerated treatment well [] Patient limited by fatique  [] Patient limited by pain  [] Patient limited by other

## 2017-12-18 ENCOUNTER — OFFICE VISIT (OUTPATIENT)
Dept: ORTHOPEDIC SURGERY | Age: 57
End: 2017-12-18

## 2017-12-18 ENCOUNTER — HOSPITAL ENCOUNTER (OUTPATIENT)
Dept: PHYSICAL THERAPY | Age: 57
Discharge: HOME | End: 2017-12-18
Admitting: ORTHOPAEDIC SURGERY

## 2017-12-18 DIAGNOSIS — M75.41 IMPINGEMENT SYNDROME OF RIGHT SHOULDER: Primary | ICD-10-CM

## 2017-12-18 PROCEDURE — 99024 POSTOP FOLLOW-UP VISIT: CPT | Performed by: ORTHOPAEDIC SURGERY

## 2017-12-18 NOTE — FLOWSHEET NOTE
[place] , [facility]    Physical Therapy Daily Treatment Note  Date:  2017    Patient Name:  Pancho Nelson    :  1960  MRN: [id number]  Restrictions/Precautions:    Medical/Treatment Diagnosis Information:  Diagnosis: M75.41 (R subacromial impingement)  Treatment Diagnosis: M75.41 (R subacromial impingement)   R SAD, DCE, labral debridement DOS   Insurance/Certification information:  PT Insurance Information: UMR/no limit   Physician Information:  Referring Practitioner: Dr. Tamela Flores of care signed (Y/N):     Date of Patient follow up with Physician:     G-Code (if applicable):      Date G-Code Applied:         Progress Note: [x]  Yes  []  No  Next due by: Visit #10      Latex Allergy:  [x]NO      []YES  Preferred Language for Healthcare:   [x]English       []other:    Visit # Insurance Allowable   8 Limit      Pain level:  2/10     SUBJECTIVE: [place]  and has some stiffness with posterior shoulder last night with sleeping.    OBJECTIVE:   Observation:   Test measurements:      RESTRICTIONS/PRECAUTIONS:  R SAD, DCE, labral debridement DOS 17    Exercises/Interventions:   Therapeutic Ex  30' Wt / Resistance Sets/sec Reps Notes   Pulleys  6 min     Cane AAROM flex/press/ER 1# 2 15          TRX walkouts    x10 Flex/abd   AAROM shoulder flex    T- band Row/pinch R 2 10    T- band ER/IR Y/R 2 10    Prone Row/ Ext  2 10     Scap squeezes    Supine t-band flexion Y 2 10    Supine SA punch 2# 2 10    SL ER  1 25 Upper traps compensat   Standing rows     NPV    No Money yellow 1 20x     Standing flex/scap       Standing Punch       LLLD ER   20\" 5x     Pendulums   x25     Upper trap stretch   3 30s    IR sleeper/ IR towel  15\" 5x            Manual Intervention       Shld /GH Mobs  15 min  Scap/shoulder Grade 1/2 GH mobs    Post Cap mobs       Thoracic/Rib manipualtion       CT MT/Mobs       PROM MT                     NMR re-education       T-spine Ext       GH depres/compress       Kierra Scap Bio       Scap/GH NMR       Body blade  ER/IR   NPV    Wall ball roll       Wall Ball bounce                  Therapeutic Exercise and NMR EXR  [x] (24407) Provided verbal/tactile cueing for activities related to strengthening, flexibility, endurance, ROM  for improvements in scapular, scapulothoracic and UE control with self care, reaching, carrying, lifting, house/yardwork, driving/computer work.    [] (48178) Provided verbal/tactile cueing for activities related to improving balance, coordination, kinesthetic sense, posture, motor skill, proprioception  to assist with  scapular, scapulothoracic and UE control with self care, reaching, carrying, lifting, house/yardwork, driving/computer work. Therapeutic Activities:    [] (28230 or 28727) Provided verbal/tactile cueing for activities related to improving balance, coordination, kinesthetic sense, posture, motor skill, proprioception and motor activation to allow for proper function of scapular, scapulothoracic and UE control with self care, carrying, lifting, driving/computer work.      Home Exercise Program:    [x] (45312) Reviewed/Progressed HEP activities related to strengthening, flexibility, endurance, ROM of scapular, scapulothoracic and UE control with self care, reaching, carrying, lifting, house/yardwork, driving/computer work  [] (78407) Reviewed/Progressed HEP activities related to improving balance, coordination, kinesthetic sense, posture, motor skill, proprioception of scapular, scapulothoracic and UE control with self care, reaching, carrying, lifting, house/yardwork, driving/computer work      Manual Treatments:  PROM / STM / Oscillations-Mobs:  G-I, II, III, IV (PA's, Inf., Post.)  [x] (97587) Provided manual therapy to mobilize soft tissue/joints of cervical/CT, scapular GHJ and UE for the purpose of modulating pain, promoting relaxation,  increasing ROM, reducing/eliminating soft tissue swelling/inflammation/restriction, improving soft tissue extensibility and allowing for proper ROM for normal function with self care, reaching, carrying, lifting, house/yardwork, driving/computer work    Modalities:  Charges:  Timed Code Treatment Minutes: 45 min   Total Treatment Minutes: 45 min      [] EVAL  [x] QX(60657) x  2   [] IONTO  [] NMR (44284) x      [] VASO  [x] Manual (21683) x  1    [] Other:  [] TA x       [] Mech Traction (33807)  [] ES(attended) (41596)      [] ES (un) (77874):     GOALS:  Patient stated goal: \"elevate R arm\"     Therapist goals for Patient:   Short Term Goals: To be achieved in: 2 weeks  1. Independent in HEP and progression per patient tolerance, in order to prevent re-injury. 2. Patient will have a decrease in pain to facilitate improvement in movement, function, and ADLs as indicated by Functional Deficits.     Long Term Goals: To be achieved in: 12 weeks  1. Disability index score of 25% or less for the DASH to assist with reaching prior level of function. 2. Patient will demonstrate increased AROM to WNL to allow for proper joint functioning as indicated by patients Functional Deficits. 3. Patient will demonstrate an increase in Strength to  5/5 to allow for proper functional mobility as indicated by patients Functional Deficits. 4. Patient will return to all functional activities without increased symptoms or restriction. Progression Towards Functional goals:  [] Patient is progressing as expected towards functional goals listed. [] Progression is slowed due to complexities listed. [] Progression has been slowed due to co-morbidities.   [x] Plan just implemented, too soon to assess goals progression  [] Other:     ASSESSMENT:      Treatment/Activity Tolerance:  [x] Patient tolerated treatment well [x] Patient limited by fatique  [] Patient limited by pain  [] Patient limited by other medical complications  [x] Other: Admits to increased fatigue with progressions and has increased weakness with ER strength and compensating with wrist extensors. Prognosis: [x] Good [] Fair  [] Poor    Patient Requires Follow-up: [x] Yes  [] No    PLAN: Progress shoulder strength as tolerated.  Decrease visit fx to 1x week   [x] Continue per plan of care [] Alter current plan (see comments)  [] Plan of care initiated [] Hold pending MD visit [] Discharge    Electronically signed by: Gerson Mccabe PTA, [id number]

## 2017-12-18 NOTE — LETTER
[facility]  [address] [facility] [address]  Phone: [phone]  Fax: [phone] [facility] [place], DO        December 18, 2017     Patient: Юлия Wiley   YOB: 1960   Date of Visit: 12/18/2017       To Whom It May Concern: It is my medical opinion that Ashley Cyr may return to work on 12/19/2017 no restrictions. If you have any questions or concerns, please don't hesitate to call.     Sincerely,         Kaylee Osullivan, DO

## 2017-12-18 NOTE — PROGRESS NOTES
History of Present of Illness:  S/P Shoulder Arthroscopy   The patient returns today for right shoulder evaluation 4 weeks after shoulder arthroscopy. Examination:  Inspection reveals warm, dry, intact skin. There is no adenopathy. The distal neurovascular exam is grossly intact. Examination of the contralateral shoulder reveals no atrophy or deformity. The skin is warm and dry. Range of motion is within normal limits. There is no focal tenderness with palpation. Provocative SLAP, biceps tension, apprehension AC joint or rotator cuff tests are negative. Strength is graded 5/5 in all muscle groups. The distal neurovascular exam is grossly intact. Cervical spine: The skin is warm and dry. There is no swelling, warmth, or erythema. Range of motion is within normal limits. There is no paraspinal or spinous process tenderness. Spurling's sign is negative and did not produce shoulder pain. The distal neurovascular exam is grossly intact. Diagnostic Test Findings:    No orders of the defined types were placed in this encounter.      Treatment Plan:  Continue to work with physical therapy return to work follow-up in 6 weeks

## 2017-12-20 ENCOUNTER — HOSPITAL ENCOUNTER (OUTPATIENT)
Dept: PHYSICAL THERAPY | Age: 57
Discharge: HOME | End: 2017-12-20
Admitting: ORTHOPAEDIC SURGERY

## 2017-12-20 NOTE — FLOWSHEET NOTE
normal function with self care, reaching, carrying, lifting, house/yardwork, driving/computer work  Dry needling manual therapy: consisted on the placement of 3 needles in the following muscles:  R upper trap. A 30 mm needle was inserted, piston, rotated, and coned to produce intramuscular mobilization. These techniques were used to restore functional range of motion, reduce muscle spasm and induce healing in the corresponding musculature. (00231)  Clean Technique was utilized today while applying Dry needling treatment. The treatment sites where cleaned with 70% solution of  isopropyl alcohol . The PT washed their hands and utilized treatment gloves along with hand  prior to inserting the needles. All needles where removed and discarded in the appropriate sharps container. Modalities:  Heat x10 min  Charges:  Timed Code Treatment Minutes: 45 min   Total Treatment Minutes: 45 min      [] EVAL  [x] RW(18339) x  1   [] IONTO  [] NMR (13185) x      [] VASO  [x] Manual (69299) x  2    [] Other:  [] TA x       [] Mech Traction (21910)  [] ES(attended) (63834)      [] ES (un) (71656):     GOALS:  Patient stated goal: \"elevate R arm\"     Therapist goals for Patient:   Short Term Goals: To be achieved in: 2 weeks  1. Independent in HEP and progression per patient tolerance, in order to prevent re-injury. 2. Patient will have a decrease in pain to facilitate improvement in movement, function, and ADLs as indicated by Functional Deficits.     Long Term Goals: To be achieved in: 12 weeks  1. Disability index score of 25% or less for the DASH to assist with reaching prior level of function. 2. Patient will demonstrate increased AROM to WNL to allow for proper joint functioning as indicated by patients Functional Deficits. 3. Patient will demonstrate an increase in Strength to  5/5 to allow for proper functional mobility as indicated by patients Functional Deficits.    4. Patient will return to all

## 2018-01-01 ENCOUNTER — HOSPITAL ENCOUNTER (OUTPATIENT)
Dept: PHYSICAL THERAPY | Age: 58
Discharge: HOME | End: 2018-01-31
Attending: ORTHOPAEDIC SURGERY | Admitting: ORTHOPAEDIC SURGERY

## 2018-01-01 RX ORDER — CARVEDILOL 12.5 MG/1
12.5 TABLET ORAL 2 TIMES DAILY
Qty: 180 TABLET | Refills: 0 | Status: SHIPPED | OUTPATIENT
Start: 2018-01-01 | End: 2018-04-02 | Stop reason: SDUPTHER

## 2018-01-03 ENCOUNTER — HOSPITAL ENCOUNTER (OUTPATIENT)
Dept: PHYSICAL THERAPY | Age: 58
Discharge: HOME | End: 2018-01-03
Admitting: ORTHOPAEDIC SURGERY

## 2018-01-03 NOTE — FLOWSHEET NOTE
[place] , [facility]    Physical Therapy Daily Treatment Note  Date:  1/3/2018    Patient Name:  Abby Hurd    :  1960  MRN: [id number]  Restrictions/Precautions:    Medical/Treatment Diagnosis Information:  Diagnosis: M75.41 (R subacromial impingement)  Treatment Diagnosis: M75.41 (R subacromial impingement)   R SAD, DCE, labral debridement DOS 77/81/53  Insurance/Certification information:  PT Insurance Information: UMR/no limit   Physician Information:  Referring Practitioner: Dr. Yadira Grimes of care signed (Y/N):     Date of Patient follow up with Physician:     G-Code (if applicable):      Date G-Code Applied:         Progress Note: [x]  Yes  []  No  Next due by: Visit #10      Latex Allergy:  [x]NO      []YES  Preferred Language for Healthcare:   [x]English       []other:    Visit # Insurance Allowable   9  1 Limit   [place] (1/3/18)     Pain level:  2/10     SUBJECTIVE: Pt reports her shoulder felt pretty good after the DN last visit, but last night the pain kept her up all night.    OBJECTIVE:   Observation:   Test measurements:      RESTRICTIONS/PRECAUTIONS:  R SAD, DCE, labral debridement DOS 17    Exercises/Interventions:   Therapeutic Ex  30' Wt / Resistance Sets/sec Reps Notes   Pulleys  6 min     Cane AAROM flex/press/ER 1# 2 15          TRX walkouts    x10 Flex/abd   AAROM shoulder flex    T- band Row/pinch R 2 10    T- band ER/IR Y/R 2 10    Prone Row/ Ext  2 10     Scap squeezes    Supine t-band flexion Y 2 10    Supine SA punch 4# 2 10 Inc weight 12/3   SL ER 1# 1 25 Upper traps compensat   Standing rows     NPV    No Money yellow 1 20x     Standing flex/scap       Standing Punch       LLLD ER   20\" 5x     Pendulums   x25     Upper trap stretch   3 30s    IR sleeper/ IR towel  15\" 5x            Manual Intervention       Shld /GH Mobs  20 min  Scap/shoulder Grade 1/2 GH mobs    IASTM  5 min  R posterior RTC    DN  5 min

## 2018-01-10 ENCOUNTER — HOSPITAL ENCOUNTER (OUTPATIENT)
Dept: PHYSICAL THERAPY | Age: 58
Discharge: HOME | End: 2018-01-10
Admitting: ORTHOPAEDIC SURGERY

## 2018-01-24 ENCOUNTER — HOSPITAL ENCOUNTER (OUTPATIENT)
Dept: PHYSICAL THERAPY | Age: 58
Discharge: HOME | End: 2018-01-24
Admitting: ORTHOPAEDIC SURGERY

## 2018-01-24 NOTE — FLOWSHEET NOTE
[address], [facility]    Physical Therapy Daily Treatment Note  Date:  2018    Patient Name:  Marine Fuentes    :  1960  MRN: [id number]  Restrictions/Precautions:    Medical/Treatment Diagnosis Information:  Diagnosis: M75.41 (R subacromial impingement)  Treatment Diagnosis: M75.41 (R subacromial impingement)   R SAD, DCE, labral debridement DOS   Insurance/Certification information:  PT Insurance Information: UMR/no limit   Physician Information:  Referring Practitioner: Dr. Fischer Bottom of care signed (Y/N):     Date of Patient follow up with Physician:     G-Code (if applicable):      Date G-Code Applied:         Progress Note: [x]  Yes  []  No  Next due by: Visit #10      Latex Allergy:  [x]NO      []YES  Preferred Language for Healthcare:   [x]English       []other:    Visit # Insurance Allowable   9  2 Limit   [place] (1/3/18)     Pain level:  2/10     SUBJECTIVE: Pt reports she is sleeping better with not much pain behind her shoulder. Pt plans to begin back in the gym in the next couple weeks.    OBJECTIVE:   Observation:   Test measurements:      RESTRICTIONS/PRECAUTIONS:  R SAD, DCE, labral debridement DOS 17    Exercises/Interventions:   Therapeutic Ex  30' Wt / Resistance Sets/sec Reps Notes                TRX walkouts    x10 Flex/abd   AAROM shoulder flex    T- band Row/pinch R 2 10    T- band ER/IR Y/R 2 10    Prone Row/ Ext  2 10     Scap squeezes    Supine t-band flexion Y 2 10    Supine SA punch 4# 2 10 Inc weight 12/3   SL ER 1# 1 25 Upper traps compensat   Standing rows  4# 2 10     No Money yellow 1 20x     Standing flex/scap       Standing Punch       LLLD ER   20\" 5x     Pendulums   x25     Upper trap stretch   3 30s    IR sleeper/ IR towel  15\" 5x     Wall push ups  2 10    Manual Intervention       Shld /GH Mobs  10 min  Scap/shoulder Grade 1/2 GH mobs    IASTM  5 min  R posterior RTC    DN       NMR re-education       T-spine Ext       GH depres/compress       Kierra Scap Bio       Scap/GH NMR       Body blade  ER/IR  30\" 4    Wall ball roll  25 2    Wall Ball bounce       Water stick  20\" 4        Therapeutic Exercise and NMR EXR  [x] (65371) Provided verbal/tactile cueing for activities related to strengthening, flexibility, endurance, ROM  for improvements in scapular, scapulothoracic and UE control with self care, reaching, carrying, lifting, house/yardwork, driving/computer work.    [] (80855) Provided verbal/tactile cueing for activities related to improving balance, coordination, kinesthetic sense, posture, motor skill, proprioception  to assist with  scapular, scapulothoracic and UE control with self care, reaching, carrying, lifting, house/yardwork, driving/computer work. Therapeutic Activities:    [] (98068 or 64316) Provided verbal/tactile cueing for activities related to improving balance, coordination, kinesthetic sense, posture, motor skill, proprioception and motor activation to allow for proper function of scapular, scapulothoracic and UE control with self care, carrying, lifting, driving/computer work.      Home Exercise Program:    [x] (10128) Reviewed/Progressed HEP activities related to strengthening, flexibility, endurance, ROM of scapular, scapulothoracic and UE control with self care, reaching, carrying, lifting, house/yardwork, driving/computer work  [] (90629) Reviewed/Progressed HEP activities related to improving balance, coordination, kinesthetic sense, posture, motor skill, proprioception of scapular, scapulothoracic and UE control with self care, reaching, carrying, lifting, house/yardwork, driving/computer work      Manual Treatments:  PROM / STM / Oscillations-Mobs:  G-I, II, III, IV (PA's, Inf., Post.)  [x] (07346) Provided manual therapy to mobilize soft tissue/joints of cervical/CT, scapular GHJ and UE for the purpose of modulating pain, promoting relaxation,  increasing ROM, Strength to  5/5 to allow for proper functional mobility as indicated by patients Functional Deficits. 4. Patient will return to all functional activities without increased symptoms or restriction. Progression Towards Functional goals:  [] Patient is progressing as expected towards functional goals listed. [] Progression is slowed due to complexities listed. [] Progression has been slowed due to co-morbidities. [x] Plan just implemented, too soon to assess goals progression  [] Other:     ASSESSMENT:      Treatment/Activity Tolerance:  [x] Patient tolerated treatment well [] Patient limited by fatique  [] Patient limited by pain  [] Patient limited by other medical complications  [x] Other: Admits to increased fatigue with progressions and has increased weakness with ER strength and compensating with wrist extensors. Prognosis: [x] Good [] Fair  [] Poor    Patient Requires Follow-up: [x] Yes  [] No    PLAN: Progress shoulder strength as tolerated. Decrease visit fx to 1x week; work towards 1-2 visits.    [x] Continue per plan of care [] Alter current plan (see comments)  [] Plan of care initiated [] Hold pending MD visit [] Discharge    Therapist was present during treatment session: Robson Bobo DPT     Electronically signed by: Lianet HAGER

## 2018-01-29 ENCOUNTER — OFFICE VISIT (OUTPATIENT)
Dept: ORTHOPEDIC SURGERY | Age: 58
End: 2018-01-29

## 2018-01-29 DIAGNOSIS — M19.211 SECONDARY OSTEOARTHRITIS OF RIGHT SHOULDER: ICD-10-CM

## 2018-01-29 DIAGNOSIS — M75.41 IMPINGEMENT SYNDROME OF RIGHT SHOULDER: Primary | ICD-10-CM

## 2018-01-29 PROCEDURE — 99024 POSTOP FOLLOW-UP VISIT: CPT | Performed by: ORTHOPAEDIC SURGERY

## 2018-01-31 ENCOUNTER — HOSPITAL ENCOUNTER (OUTPATIENT)
Dept: PHYSICAL THERAPY | Age: 58
Discharge: HOME | End: 2018-02-01
Admitting: ORTHOPAEDIC SURGERY

## 2018-01-31 NOTE — FLOWSHEET NOTE
depres/compress       Kierra Scap Bio       Scap/GH NMR       Body blade  ER/IR  30\" 4    Wall ball roll  25 2    Wall Ball bounce       Water stick  20\" 4        Therapeutic Exercise and NMR EXR  [x] (32000) Provided verbal/tactile cueing for activities related to strengthening, flexibility, endurance, ROM  for improvements in scapular, scapulothoracic and UE control with self care, reaching, carrying, lifting, house/yardwork, driving/computer work.    [] (51812) Provided verbal/tactile cueing for activities related to improving balance, coordination, kinesthetic sense, posture, motor skill, proprioception  to assist with  scapular, scapulothoracic and UE control with self care, reaching, carrying, lifting, house/yardwork, driving/computer work. Therapeutic Activities:    [] (98135 or 02860) Provided verbal/tactile cueing for activities related to improving balance, coordination, kinesthetic sense, posture, motor skill, proprioception and motor activation to allow for proper function of scapular, scapulothoracic and UE control with self care, carrying, lifting, driving/computer work.      Home Exercise Program:    [x] (87062) Reviewed/Progressed HEP activities related to strengthening, flexibility, endurance, ROM of scapular, scapulothoracic and UE control with self care, reaching, carrying, lifting, house/yardwork, driving/computer work  [] (82651) Reviewed/Progressed HEP activities related to improving balance, coordination, kinesthetic sense, posture, motor skill, proprioception of scapular, scapulothoracic and UE control with self care, reaching, carrying, lifting, house/yardwork, driving/computer work      Manual Treatments:  PROM / STM / Oscillations-Mobs:  G-I, II, III, IV (PA's, Inf., Post.)  [x] (55478) Provided manual therapy to mobilize soft tissue/joints of cervical/CT, scapular GHJ and UE for the purpose of modulating pain, promoting relaxation,  increasing ROM, reducing/eliminating soft tissue proper functional mobility as indicated by patients Functional Deficits. 4. Patient will return to all functional activities without increased symptoms or restriction. Progression Towards Functional goals:  [] Patient is progressing as expected towards functional goals listed. [] Progression is slowed due to complexities listed. [] Progression has been slowed due to co-morbidities. [x] Plan just implemented, too soon to assess goals progression  [] Other:     ASSESSMENT:      Treatment/Activity Tolerance:  [x] Patient tolerated treatment well [] Patient limited by fatique  [] Patient limited by pain  [] Patient limited by other medical complications  [x] Other: Admits to increased fatigue with progressions and has increased weakness with ER strength and compensating with wrist extensors. Prognosis: [x] Good [] Fair  [] Poor    Patient Requires Follow-up: [x] Yes  [] No    PLAN: Pt DC at this time  [x] Continue per plan of care [] Alter current plan (see comments)  [] Plan of care initiated [] Hold pending MD visit [] Discharge    Therapist was present during treatment session: Lazarus Aquino DPT     Electronically signed by:  Teodoro Cassidy SPT

## 2018-02-01 ENCOUNTER — HOSPITAL ENCOUNTER (OUTPATIENT)
Dept: PHYSICAL THERAPY | Age: 58
Discharge: HOME | End: 2018-02-28
Attending: ORTHOPAEDIC SURGERY | Admitting: ORTHOPAEDIC SURGERY

## 2018-02-02 ENCOUNTER — OFFICE VISIT (OUTPATIENT)
Dept: FAMILY MEDICINE CLINIC | Age: 58
End: 2018-02-02

## 2018-02-02 VITALS
TEMPERATURE: 97.3 F | BODY MASS INDEX: 27.23 KG/M2 | HEIGHT: 62 IN | DIASTOLIC BLOOD PRESSURE: 69 MMHG | SYSTOLIC BLOOD PRESSURE: 115 MMHG | HEART RATE: 70 BPM | OXYGEN SATURATION: 100 % | WEIGHT: 148 LBS

## 2018-02-02 DIAGNOSIS — R80.9 PERSISTENT PROTEINURIA ASSOCIATED WITH TYPE 2 DIABETES MELLITUS (HCC): ICD-10-CM

## 2018-02-02 DIAGNOSIS — I10 ESSENTIAL HYPERTENSION: Chronic | ICD-10-CM

## 2018-02-02 DIAGNOSIS — E78.00 PURE HYPERCHOLESTEROLEMIA: Primary | ICD-10-CM

## 2018-02-02 DIAGNOSIS — E11.29 PERSISTENT PROTEINURIA ASSOCIATED WITH TYPE 2 DIABETES MELLITUS (HCC): ICD-10-CM

## 2018-02-02 PROCEDURE — 99214 OFFICE O/P EST MOD 30 MIN: CPT | Performed by: FAMILY MEDICINE

## 2018-02-02 ASSESSMENT — PATIENT HEALTH QUESTIONNAIRE - PHQ9
2. FEELING DOWN, DEPRESSED OR HOPELESS: 0
1. LITTLE INTEREST OR PLEASURE IN DOING THINGS: 0
SUM OF ALL RESPONSES TO PHQ9 QUESTIONS 1 & 2: 0
SUM OF ALL RESPONSES TO PHQ QUESTIONS 1-9: 0

## 2018-02-27 LAB
CATARACTS: POSITIVE
DIABETIC RETINOPATHY: NORMAL
GLAUCOMA: NEGATIVE
INTRAOCULAR PRESSURE EYE: NORMAL
VISUAL ACUITY DISTANCE LEFT EYE: NORMAL
VISUAL ACUITY DISTANCE RIGHT EYE: NORMAL

## 2018-03-01 ENCOUNTER — HOSPITAL ENCOUNTER (OUTPATIENT)
Dept: PHYSICAL THERAPY | Age: 58
Discharge: HOME | End: 2018-03-31
Attending: ORTHOPAEDIC SURGERY | Admitting: ORTHOPAEDIC SURGERY

## 2018-03-29 LAB
AVERAGE GLUCOSE: NORMAL
HBA1C MFR BLD: 7 %

## 2018-04-06 RX ORDER — CARVEDILOL 12.5 MG/1
TABLET ORAL
Qty: 180 TABLET | Refills: 0 | Status: SHIPPED | OUTPATIENT
Start: 2018-04-06 | End: 2018-07-08 | Stop reason: SDUPTHER

## 2018-04-30 ENCOUNTER — OFFICE VISIT (OUTPATIENT)
Dept: ORTHOPEDIC SURGERY | Age: 58
End: 2018-04-30

## 2018-04-30 VITALS — BODY MASS INDEX: 27.22 KG/M2 | WEIGHT: 147.93 LBS | HEIGHT: 62 IN

## 2018-04-30 DIAGNOSIS — M75.41 IMPINGEMENT SYNDROME OF RIGHT SHOULDER: Primary | ICD-10-CM

## 2018-04-30 DIAGNOSIS — I10 ESSENTIAL HYPERTENSION: Chronic | ICD-10-CM

## 2018-04-30 DIAGNOSIS — M19.211 SECONDARY OSTEOARTHRITIS OF RIGHT SHOULDER: ICD-10-CM

## 2018-04-30 PROCEDURE — 99213 OFFICE O/P EST LOW 20 MIN: CPT | Performed by: ORTHOPAEDIC SURGERY

## 2018-05-02 RX ORDER — LISINOPRIL 20 MG/1
TABLET ORAL
Qty: 180 TABLET | Refills: 0 | Status: SHIPPED | OUTPATIENT
Start: 2018-05-02 | End: 2018-08-06 | Stop reason: SDUPTHER

## 2018-06-06 LAB
CHOLESTEROL, TOTAL: 136 MG/DL
CHOLESTEROL/HDL RATIO: 2.5
HDLC SERPL-MCNC: 55 MG/DL (ref 35–70)
LDL CHOLESTEROL CALCULATED: 61 MG/DL (ref 0–160)
TRIGL SERPL-MCNC: 101 MG/DL
VLDLC SERPL CALC-MCNC: NORMAL MG/DL

## 2018-07-09 RX ORDER — CARVEDILOL 12.5 MG/1
TABLET ORAL
Qty: 180 TABLET | Refills: 0 | Status: SHIPPED | OUTPATIENT
Start: 2018-07-09 | End: 2018-10-21 | Stop reason: SDUPTHER

## 2018-08-06 DIAGNOSIS — I10 ESSENTIAL HYPERTENSION: Chronic | ICD-10-CM

## 2018-08-06 RX ORDER — LISINOPRIL 20 MG/1
TABLET ORAL
Qty: 180 TABLET | Refills: 3 | Status: SHIPPED | OUTPATIENT
Start: 2018-08-06 | End: 2019-09-16 | Stop reason: SDUPTHER

## 2018-09-14 LAB
BASOPHILS ABSOLUTE: NORMAL /ΜL
BASOPHILS RELATIVE PERCENT: NORMAL %
EOSINOPHILS ABSOLUTE: NORMAL /ΜL
EOSINOPHILS RELATIVE PERCENT: NORMAL %
HCT VFR BLD CALC: NORMAL % (ref 36–46)
HEMOGLOBIN: NORMAL G/DL (ref 12–16)
LYMPHOCYTES ABSOLUTE: NORMAL /ΜL
LYMPHOCYTES RELATIVE PERCENT: NORMAL %
MCH RBC QN AUTO: NORMAL PG
MCHC RBC AUTO-ENTMCNC: NORMAL G/DL
MCV RBC AUTO: NORMAL FL
MONOCYTES ABSOLUTE: NORMAL /ΜL
MONOCYTES RELATIVE PERCENT: NORMAL %
NEUTROPHILS ABSOLUTE: NORMAL /ΜL
NEUTROPHILS RELATIVE PERCENT: NORMAL %
PLATELET # BLD: NORMAL K/ΜL
PMV BLD AUTO: NORMAL FL
RBC # BLD: NORMAL 10^6/ΜL
URIC ACID: 8.7
WBC # BLD: NORMAL 10^3/ML

## 2018-10-11 PROBLEM — E11.9 TYPE 2 DIABETES MELLITUS (HCC): Status: ACTIVE | Noted: 2018-10-11

## 2018-10-12 ENCOUNTER — OFFICE VISIT (OUTPATIENT)
Dept: FAMILY MEDICINE CLINIC | Age: 58
End: 2018-10-12
Payer: COMMERCIAL

## 2018-10-12 VITALS
HEART RATE: 68 BPM | WEIGHT: 156 LBS | HEIGHT: 62 IN | DIASTOLIC BLOOD PRESSURE: 64 MMHG | BODY MASS INDEX: 28.71 KG/M2 | SYSTOLIC BLOOD PRESSURE: 108 MMHG | OXYGEN SATURATION: 100 %

## 2018-10-12 DIAGNOSIS — R80.9 PERSISTENT PROTEINURIA ASSOCIATED WITH TYPE 2 DIABETES MELLITUS (HCC): ICD-10-CM

## 2018-10-12 DIAGNOSIS — Z13.220 SCREENING FOR HYPERLIPIDEMIA: ICD-10-CM

## 2018-10-12 DIAGNOSIS — L98.9 FOOT LESION: ICD-10-CM

## 2018-10-12 DIAGNOSIS — I10 ESSENTIAL HYPERTENSION: Chronic | ICD-10-CM

## 2018-10-12 DIAGNOSIS — N18.30 TYPE 2 DIABETES MELLITUS WITH STAGE 3 CHRONIC KIDNEY DISEASE, WITH LONG-TERM CURRENT USE OF INSULIN (HCC): ICD-10-CM

## 2018-10-12 DIAGNOSIS — Z00.00 HEALTH CARE MAINTENANCE: Primary | ICD-10-CM

## 2018-10-12 DIAGNOSIS — E11.29 PERSISTENT PROTEINURIA ASSOCIATED WITH TYPE 2 DIABETES MELLITUS (HCC): ICD-10-CM

## 2018-10-12 DIAGNOSIS — E11.22 TYPE 2 DIABETES MELLITUS WITH STAGE 3 CHRONIC KIDNEY DISEASE, WITH LONG-TERM CURRENT USE OF INSULIN (HCC): ICD-10-CM

## 2018-10-12 DIAGNOSIS — M72.2 PLANTAR FASCIAL FIBROMATOSIS OF LEFT FOOT: ICD-10-CM

## 2018-10-12 DIAGNOSIS — Z23 NEED FOR PROPHYLACTIC VACCINATION AND INOCULATION AGAINST INFLUENZA: ICD-10-CM

## 2018-10-12 DIAGNOSIS — E78.00 PURE HYPERCHOLESTEROLEMIA: ICD-10-CM

## 2018-10-12 DIAGNOSIS — Z79.4 TYPE 2 DIABETES MELLITUS WITH STAGE 3 CHRONIC KIDNEY DISEASE, WITH LONG-TERM CURRENT USE OF INSULIN (HCC): ICD-10-CM

## 2018-10-12 DIAGNOSIS — Z12.31 ENCOUNTER FOR SCREENING MAMMOGRAM FOR BREAST CANCER: ICD-10-CM

## 2018-10-12 DIAGNOSIS — Z23 NEED FOR PROPHYLACTIC VACCINATION AND INOCULATION AGAINST VARICELLA: ICD-10-CM

## 2018-10-12 LAB — HBA1C MFR BLD: 7.4 %

## 2018-10-12 PROCEDURE — 90688 IIV4 VACCINE SPLT 0.5 ML IM: CPT | Performed by: FAMILY MEDICINE

## 2018-10-12 PROCEDURE — 90471 IMMUNIZATION ADMIN: CPT | Performed by: FAMILY MEDICINE

## 2018-10-12 PROCEDURE — 99396 PREV VISIT EST AGE 40-64: CPT | Performed by: FAMILY MEDICINE

## 2018-10-12 PROCEDURE — 83036 HEMOGLOBIN GLYCOSYLATED A1C: CPT | Performed by: FAMILY MEDICINE

## 2018-10-12 NOTE — PROGRESS NOTES
2 Dose Series) 11/12/2012    Breast cancer screen  06/24/2018    Potassium monitoring  11/14/2018    Creatinine monitoring  11/14/2018    Diabetic retinal exam  02/27/2019    Lipid screen  06/06/2019    Cervical cancer screen  06/15/2019    Diabetic foot exam  10/12/2019    A1C test (Diabetic or Prediabetic)  10/12/2019    Colon cancer screen colonoscopy  04/21/2021    DTaP/Tdap/Td vaccine (2 - Td) 11/24/2025    Flu vaccine  Completed    Pneumococcal med risk  Completed    Hepatitis C screen  Completed    HIV screen  Completed       ASSESSMENT/PLAN:    Screening blood work as below  Up to date on Colonoscopy  tobacco screening neg  Flu shot given  Sees gyn for pap smears and mammogram, she will schedule with them  Declines shingles vaccine  Declines starting baby aspirin    Cami Valentin was seen today for established new doctor, diabetes, hypertension and hyperlipidemia. Diagnoses and all orders for this visit:    Health care maintenance  -     LIPID PANEL; Future    Need for prophylactic vaccination and inoculation against influenza  -     INFLUENZA, QUADV, 3 YRS AND OLDER, IM, MDV, 0.5ML ([facility])    Essential hypertension  At goal, cont meds as prescribed    Type 2 diabetes mellitus with stage 3 chronic kidney disease, with long-term current use of insulin (HCC)  A1c of 7.4 not at goal, defer to endocrine  -     POCT glycosylated hemoglobin (Hb A1C)  -      DIABETES FOOT EXAM    Plantar fascial fibromatosis of left foot  -     [facility]- Roe Bonilla DPM    Return in about 1 year (around 10/12/2019). An electronic signature was used to authenticate this note.     --Manuel Sanders MD on 10/12/2018 at 10:12 AM

## 2018-10-22 RX ORDER — CARVEDILOL 12.5 MG/1
TABLET ORAL
Qty: 180 TABLET | Refills: 1 | Status: SHIPPED | OUTPATIENT
Start: 2018-10-22 | End: 2019-05-12 | Stop reason: SDUPTHER

## 2018-11-13 ENCOUNTER — OFFICE VISIT (OUTPATIENT)
Dept: FAMILY MEDICINE CLINIC | Age: 58
End: 2018-11-13
Payer: COMMERCIAL

## 2018-11-13 VITALS
DIASTOLIC BLOOD PRESSURE: 73 MMHG | HEART RATE: 73 BPM | TEMPERATURE: 98.6 F | WEIGHT: 156 LBS | HEIGHT: 62 IN | BODY MASS INDEX: 28.71 KG/M2 | SYSTOLIC BLOOD PRESSURE: 144 MMHG | OXYGEN SATURATION: 99 %

## 2018-11-13 DIAGNOSIS — R09.89 CHEST CONGESTION: ICD-10-CM

## 2018-11-13 DIAGNOSIS — R05.8 COUGH WITH EXPECTORATION: Primary | ICD-10-CM

## 2018-11-13 PROCEDURE — 99213 OFFICE O/P EST LOW 20 MIN: CPT | Performed by: FAMILY MEDICINE

## 2018-11-13 ASSESSMENT — ENCOUNTER SYMPTOMS: COUGH: 1

## 2019-05-13 RX ORDER — CARVEDILOL 12.5 MG/1
TABLET ORAL
Qty: 180 TABLET | Refills: 0 | Status: SHIPPED | OUTPATIENT
Start: 2019-05-13 | End: 2019-08-11 | Stop reason: SDUPTHER

## 2019-08-12 RX ORDER — CARVEDILOL 12.5 MG/1
TABLET ORAL
Qty: 180 TABLET | Refills: 0 | Status: SHIPPED | OUTPATIENT
Start: 2019-08-12 | End: 2019-11-11 | Stop reason: SDUPTHER

## 2019-09-30 DIAGNOSIS — I10 ESSENTIAL HYPERTENSION: Chronic | ICD-10-CM

## 2019-09-30 RX ORDER — LISINOPRIL 20 MG/1
TABLET ORAL
Qty: 30 TABLET | Refills: 0 | Status: SHIPPED | OUTPATIENT
Start: 2019-09-30 | End: 2019-10-16 | Stop reason: SDUPTHER

## 2019-10-16 DIAGNOSIS — I10 ESSENTIAL HYPERTENSION: Chronic | ICD-10-CM

## 2019-10-16 RX ORDER — LISINOPRIL 20 MG/1
TABLET ORAL
Qty: 30 TABLET | Refills: 0 | Status: SHIPPED | OUTPATIENT
Start: 2019-10-16 | End: 2019-11-02 | Stop reason: SDUPTHER

## 2019-11-02 DIAGNOSIS — I10 ESSENTIAL HYPERTENSION: Chronic | ICD-10-CM

## 2019-11-04 RX ORDER — LISINOPRIL 20 MG/1
TABLET ORAL
Qty: 30 TABLET | Refills: 0 | Status: SHIPPED | OUTPATIENT
Start: 2019-11-04 | End: 2019-11-05 | Stop reason: SDUPTHER

## 2019-11-05 ENCOUNTER — OFFICE VISIT (OUTPATIENT)
Dept: FAMILY MEDICINE CLINIC | Age: 59
End: 2019-11-05
Payer: COMMERCIAL

## 2019-11-05 VITALS
TEMPERATURE: 98.2 F | OXYGEN SATURATION: 98 % | SYSTOLIC BLOOD PRESSURE: 134 MMHG | HEART RATE: 69 BPM | BODY MASS INDEX: 29.08 KG/M2 | DIASTOLIC BLOOD PRESSURE: 82 MMHG | WEIGHT: 159 LBS

## 2019-11-05 DIAGNOSIS — I10 ESSENTIAL HYPERTENSION: Chronic | ICD-10-CM

## 2019-11-05 DIAGNOSIS — E78.5 HYPERLIPIDEMIA, UNSPECIFIED HYPERLIPIDEMIA TYPE: ICD-10-CM

## 2019-11-05 DIAGNOSIS — Z00.00 HEALTHCARE MAINTENANCE: Primary | ICD-10-CM

## 2019-11-05 PROCEDURE — 90471 IMMUNIZATION ADMIN: CPT | Performed by: FAMILY MEDICINE

## 2019-11-05 PROCEDURE — 90686 IIV4 VACC NO PRSV 0.5 ML IM: CPT | Performed by: FAMILY MEDICINE

## 2019-11-05 PROCEDURE — 99396 PREV VISIT EST AGE 40-64: CPT | Performed by: FAMILY MEDICINE

## 2019-11-05 RX ORDER — LISINOPRIL 20 MG/1
TABLET ORAL
Qty: 90 TABLET | Refills: 3 | Status: SHIPPED | OUTPATIENT
Start: 2019-11-05 | End: 2020-07-06

## 2019-11-05 RX ORDER — ATORVASTATIN CALCIUM 20 MG/1
20 TABLET, FILM COATED ORAL DAILY
Qty: 90 TABLET | Refills: 3 | Status: SHIPPED | OUTPATIENT
Start: 2019-11-05

## 2019-11-05 ASSESSMENT — PATIENT HEALTH QUESTIONNAIRE - PHQ9
DEPRESSION UNABLE TO ASSESS: FUNCTIONAL CAPACITY MOTIVATION LIMITS ACCURACY
SUM OF ALL RESPONSES TO PHQ9 QUESTIONS 1 & 2: 0
SUM OF ALL RESPONSES TO PHQ QUESTIONS 1-9: 0
2. FEELING DOWN, DEPRESSED OR HOPELESS: 0
1. LITTLE INTEREST OR PLEASURE IN DOING THINGS: 0
SUM OF ALL RESPONSES TO PHQ QUESTIONS 1-9: 0

## 2019-11-12 RX ORDER — CARVEDILOL 12.5 MG/1
TABLET ORAL
Qty: 180 TABLET | Refills: 0 | Status: SHIPPED | OUTPATIENT
Start: 2019-11-12 | End: 2020-02-17

## 2020-02-17 RX ORDER — CARVEDILOL 12.5 MG/1
TABLET ORAL
Qty: 180 TABLET | Refills: 0 | Status: SHIPPED | OUTPATIENT
Start: 2020-02-17

## 2020-03-16 PROBLEM — E11.319 DIABETIC RETINOPATHY ASSOCIATED WITH TYPE 2 DIABETES MELLITUS (HCC): Status: ACTIVE | Noted: 2020-03-16

## 2020-07-14 ENCOUNTER — VIRTUAL VISIT (OUTPATIENT)
Dept: FAMILY MEDICINE CLINIC | Age: 60
End: 2020-07-14
Payer: COMMERCIAL

## 2020-07-14 PROCEDURE — 99214 OFFICE O/P EST MOD 30 MIN: CPT | Performed by: FAMILY MEDICINE

## 2020-07-14 RX ORDER — ALLOPURINOL 100 MG/1
TABLET ORAL DAILY
COMMUNITY
Start: 2020-07-04

## 2020-07-14 ASSESSMENT — PATIENT HEALTH QUESTIONNAIRE - PHQ9
SUM OF ALL RESPONSES TO PHQ9 QUESTIONS 1 & 2: 0
SUM OF ALL RESPONSES TO PHQ QUESTIONS 1-9: 0
SUM OF ALL RESPONSES TO PHQ QUESTIONS 1-9: 0
1. LITTLE INTEREST OR PLEASURE IN DOING THINGS: 0
2. FEELING DOWN, DEPRESSED OR HOPELESS: 0

## 2020-07-14 NOTE — PROGRESS NOTES
2020    TELEHEALTH EVALUATION -- Audio/Visual ([facility]    CC: HTN    HPI    Bard Leija (:  1960) sherry 64 y.o. female has requested an audio/video evaluation for the following concern(s):    Essential hypertension  Controlled, taking BP meds as prescribed, BP has been running around 110's/70's    Pure hypercholesterolemia  Taking lipitor    Type 2 diabetes mellitus with stage 3 chronic kidney disease, with long-term current use of insulin ([facility] [address]  Seeing endo, taking trulicity and toujeo    Review of Systems  Constitutional: No fever  Pulmonary: No cough    All other systems reviewed and negative    Prior to Visit Medications    Medication Sig Taking? Authorizing Provider   lisinopril (PRINIVIL;ZESTRIL) 20 MG tablet TAKE ONE TABLET BY MOUTH TWICE A DAY Yes Zach Obrien MD   carvedilol (COREG) 12.5 MG tablet TAKE ONE TABLET BY MOUTH TWICE A DAY Yes Cheryl Meadows MD   atorvastatin (LIPITOR) 20 MG tablet Take 1 tablet by mouth daily Yes Juvenal Agosto MD   amLODIPine (NORVASC) 10 MG tablet Take 10 mg by mouth daily Yes Historical Provider, MD   Dulaglutide (TRULICITY) 5.48 OC/0.5FX SOPN Inject into the skin Yes Historical Provider, MD   vitamin D (CHOLECALCIFEROL) 1000 UNITS TABS tablet Take 1,000 Units by mouth daily Yes Historical Provider, MD   Insulin Glargine (TOUJEO SOLOSTAR) 300 UNIT/ML SOPN Inject 20 Units into the skin nightly  Yes Historical Provider, MD   SOFT TOUCH LANCETS MISC 1 Stick by Does not apply route 2 times daily as needed.  Yes Jeffery Artist, MD   allopurinol (ZYLOPRIM) 100 MG tablet daily  Historical Provider, MD   Prenatal Multivit-Min-Fe-FA (PRE-LUMA FORMULA PO) Take by mouth  Historical Provider, MD        Allergies   Allergen Reactions    Actos [Pioglitazone Hydrochloride]     Metformin And Related Diarrhea    Sulfamethoxazole-Trimethoprim      Stage 3 CKD    Tramadol        Past Medical History:   Diagnosis Date    4th nerve palsy 2017    R eye, Dr. Almonte Right    Anemia associated with chronic renal failure     CKD (chronic kidney disease) stage 3, GFR 30-59 ml/min ([facility])     Dr. Chayito Pratt    Colon polyp 2016    Diabetes with proteinuria     Diabetic retinopathy ([facility] [address] 2018    Hyperlipidemia     Hypertension     Type II or unspecified type diabetes mellitus without mention of complication, not stated as uncontrolled     Vitamin D deficiency        Past Surgical History:   Procedure Laterality Date     SECTION      COLONOSCOPY  2016    Dr. Tommy Massey ARTHROSCOPY Right 2017    PROCEDURE: RIGHT SHOULDER ARTHROSCOPE, SUBACROMIAL DECOMPRESSION, DISTALCLAVICLE EXCISION, SYNOVECTOMY, LABRAL DEBREIDEMENT, EXTENSIVE DEBRIDEMENT       Social History     Socioeconomic History    Marital status: Single     Spouse name: Not on file    Number of children: Not on file    Years of education: Not on file    Highest education level: Not on file   Occupational History    Not on file   Social Needs    Financial resource strain: Not on file    Food insecurity     Worry: Not on file     Inability: Not on file    Transportation needs     Medical: Not on file     Non-medical: Not on file   Tobacco Use    Smoking status: Never Smoker    Smokeless tobacco: Never Used   Substance and Sexual Activity    Alcohol use: No     Alcohol/week: 1.0 standard drinks     Types: 1 Glasses of wine per week    Drug use: No    Sexual activity: Never   Lifestyle    Physical activity     Days per week: Not on file     Minutes per session: Not on file    Stress: Not on file   Relationships    Social connections     Talks on phone: Not on file     Gets together: Not on file     Attends Spiritism service: Not on file     Active member of club or organization: Not on file     Attends meetings of clubs or organizations: Not on file     Relationship status: Not on file    Intimate partner violence     Fear of current or ex public health emergency), evaluation of the following organ systems was limited: Vitals/Constitutional/EENT/Resp/CV/GI//MS/Neuro/Skin/Heme-Lymph-Imm. Pursuant to the emergency declaration under the [address] [place] [address], [address] [place] [address] authority and the [employer] Act, this Virtual Visit was conducted with patient's (and/or legal guardian's) consent, to reduce the patient's risk of exposure to COVID-19 and provide necessary medical care. The patient (and/or legal guardian) has also been advised to contact this office for worsening conditions or problems, and seek emergency medical treatment and/or call 911 if deemed necessary. Patient identification was verified at the start of the visit: Yes    Total time spent on this encounter: Not billed by time    Services were provided through a video synchronous discussion virtually to substitute for in-person clinic visit. Patient and provider were located at their individual homes. An  electronic signature was used to authenticate this note.     --Neena Berg MD on 7/14/2020 at 9:50 AM

## 2020-07-17 ENCOUNTER — TELEPHONE (OUTPATIENT)
Dept: FAMILY MEDICINE CLINIC | Age: 60
End: 2020-07-17

## 2020-07-17 ENCOUNTER — VIRTUAL VISIT (OUTPATIENT)
Dept: FAMILY MEDICINE CLINIC | Age: 60
End: 2020-07-17
Payer: COMMERCIAL

## 2020-07-17 PROCEDURE — 99214 OFFICE O/P EST MOD 30 MIN: CPT | Performed by: FAMILY MEDICINE

## 2020-07-17 NOTE — PROGRESS NOTES
2020    TELEHEALTH EVALUATION -- Audio/Visual (During [facility]-02 public health emergency)    CC: back pain    HPI    Sofia Erwin (:  1960) sherry 64 y.o. female has requested an audio/video evaluation for the following concern(s):    Back pain  Started Tuesday, Had sciatica 3 years ago on L side, this feels like sciatica on R    DMII  Seeing endo, taking trulicity and toujeo    HTN  Controlled, taking BP meds as prescribed    Review of Systems  + back pain  Consitutional: No fevers, no hx of cancer  Neuro: No numbness or tingling in groin  /GI: No urinary incontinence    All other systems reviewed and negative    Prior to Visit Medications    Medication Sig Taking? Authorizing Provider   allopurinol (ZYLOPRIM) 100 MG tablet daily Yes Historical Provider, MD   lisinopril (PRINIVIL;ZESTRIL) 20 MG tablet TAKE ONE TABLET BY MOUTH TWICE A DAY Yes Livan Portillo MD   carvedilol (COREG) 12.5 MG tablet TAKE ONE TABLET BY MOUTH TWICE A DAY Yes Kvng Castle MD   atorvastatin (LIPITOR) 20 MG tablet Take 1 tablet by mouth daily Yes Tico Vila MD   amLODIPine (NORVASC) 10 MG tablet Take 10 mg by mouth daily Yes Historical Provider, MD   Dulaglutide (TRULICITY) 7.43 JS/6.2MG SOPN Inject into the skin Yes Historical Provider, MD   vitamin D (CHOLECALCIFEROL) 1000 UNITS TABS tablet Take 1,000 Units by mouth daily Yes Historical Provider, MD   Insulin Glargine (TOUJEO SOLOSTAR) 300 UNIT/ML SOPN Inject 20 Units into the skin nightly  Yes Historical Provider, MD   SOFT TOUCH LANCETS MISC 1 Stick by Does not apply route 2 times daily as needed.  Yes Adwoa Hurd MD   Prenatal Multivit-Min-Fe-FA (PRE-LUMA FORMULA PO) Take by mouth  Historical Provider, MD        Allergies   Allergen Reactions    Actos [Pioglitazone Hydrochloride]     Metformin And Related Diarrhea    Sulfamethoxazole-Trimethoprim      Stage 3 CKD    Tramadol        Past Medical History:   Diagnosis Date    4th nerve palsy 2017 R eye, Dr. Charlee Guzman    Anemia associated with chronic renal failure     CKD (chronic kidney disease) stage 3, GFR 30-59 ml/min ([facility])     Dr. Josh Jarrett    Colon polyp 2016    Diabetes with proteinuria     Diabetic retinopathy ([facility] [address] 2018    Hyperlipidemia     Hypertension     Type II or unspecified type diabetes mellitus without mention of complication, not stated as uncontrolled     Vitamin D deficiency        Past Surgical History:   Procedure Laterality Date     SECTION      COLONOSCOPY  2016    Dr. Jose Christie ARTHROSCOPY Right 2017    PROCEDURE: RIGHT SHOULDER ARTHROSCOPE, SUBACROMIAL DECOMPRESSION, DISTALCLAVICLE EXCISION, SYNOVECTOMY, LABRAL DEBREIDEMENT, EXTENSIVE DEBRIDEMENT       Social History     Socioeconomic History    Marital status: Single     Spouse name: Not on file    Number of children: Not on file    Years of education: Not on file    Highest education level: Not on file   Occupational History    Not on file   Social Needs    Financial resource strain: Not on file    Food insecurity     Worry: Not on file     Inability: Not on file    Transportation needs     Medical: Not on file     Non-medical: Not on file   Tobacco Use    Smoking status: Never Smoker    Smokeless tobacco: Never Used   Substance and Sexual Activity    Alcohol use: No     Alcohol/week: 1.0 standard drinks     Types: 1 Glasses of wine per week    Drug use: No    Sexual activity: Never   Lifestyle    Physical activity     Days per week: Not on file     Minutes per session: Not on file    Stress: Not on file   Relationships    Social connections     Talks on phone: Not on file     Gets together: Not on file     Attends Orthodoxy service: Not on file     Active member of club or organization: Not on file     Attends meetings of clubs or organizations: Not on file     Relationship status: Not on file    Intimate partner violence     Fear of current or ex partner: Not on file Emotionally abused: Not on file     Physically abused: Not on file     Forced sexual activity: Not on file   Other Topics Concern    Not on file   Social History Narrative    Not on file        Family History   Problem Relation Age of Onset    Diabetes Mother     Kidney Disease Mother     Diabetes Sister     Kidney Disease Sister     Early Death Sister     Heart Disease Maternal Grandmother        PHYSICAL EXAMINATION:    Constitutional:  Appears well-developed and well-nourished. No apparent distress    Mental status:  Alert and awake. Able to follow commands  Eyes: normal EOM, normal sclera, no discharge visible  HENT: Normocephalic, atraumatic  External Ears: Normal  Neck: No visualized mass   Pulmonary/Chest:  Respiratory effort normal. No visualized signs of difficulty breathing or respiratory distress  Musculoskeletal: Normal range of motion of neck  Neurological: No Facial Asymmetry (Cranial nerve 7 motor function) (limited exam to video visit). No gaze palsy  Skin: No significant exanthematous lesions or discoloration noted on facial skin  Psychiatric: Normal affect, no hallucinations    ASSESSMENT/PLAN:  Rigoberto Alfred was seen today for back pain. Diagnoses and all orders for this visit:    Acute low back pain without sciatica, unspecified back pain laterality  No red flags, pt instructed to stretch, she will take OTC tylenol for pain, she declines referral for PT    Type 2 diabetes mellitus with stage 3 chronic kidney disease, with long-term current use of insulin ([facility] [address]  Cont seeing endo, taking trulicity and toujeo    Essential hypertension  Controlled, cont taking BP meds as prescribed    Return in about 6 months (around 2021). Isaac Leon (:  1960) sherry 64 y.o. female is being evaluated by a Virtual Visit (video visit) encounter to address concerns as mentioned above. A caregiver was present when appropriate.  Due to this being a TeleHealth encounter (During [facility] public health emergency), evaluation of the following organ systems was limited: Vitals/Constitutional/EENT/Resp/CV/GI//MS/Neuro/Skin/Heme-Lymph-Imm. Pursuant to the emergency declaration under the [facility], [address] [place] [address] authority and the [employer] Act, this Virtual Visit was conducted with patient's (and/or legal guardian's) consent, to reduce the patient's risk of exposure to COVID-19 and provide necessary medical care. The patient (and/or legal guardian) has also been advised to contact this office for worsening conditions or problems, and seek emergency medical treatment and/or call 911 if deemed necessary. Patient identification was verified at the start of the visit: Yes    Total time spent on this encounter: Not billed by time    Services were provided through a video synchronous discussion virtually to substitute for in-person clinic visit. Patient and provider were located at their individual homes. An  electronic signature was used to authenticate this note.     --Jannette Funk MD on 7/17/2020 at 2:52 PM

## 2020-07-17 NOTE — TELEPHONE ENCOUNTER
----- Message from Mata Leavitt sent at 7/17/2020  9:42 AM EDT -----  Subject: Message to Provider    QUESTIONS  Information for Provider? Pt called and said she would like for her   provider to give her a call. She said she is experiencing some pain and   she think it is sciatica and she wants to know what her doctor think she   should do before its worse to where she can't walk. Please advise   ---------------------------------------------------------------------------  --------------  CALL BACK INFO  What is the best way for the office to contact you? OK to leave message on   voicemail  Preferred Call Back Phone Number? [phone]  ---------------------------------------------------------------------------  --------------  SCRIPT ANSWERS  Relationship to Patient?  Self

## 2020-10-15 LAB
AVERAGE GLUCOSE: NORMAL
HBA1C MFR BLD: 6.3 %

## 2021-03-10 LAB — LIPASE: 48 UNITS/L

## 2021-03-22 LAB
ALBUMIN: 3.8
ALP BLD-CCNC: 50 U/L
ALT SERPL-CCNC: 8 U/L
AST SERPL-CCNC: 13 U/L
BILIRUB SERPL-MCNC: 0.4 MG/DL (ref 0.1–1.4)

## 2021-03-23 LAB
CREATININE: 1.8 MG/DL
POTASSIUM (K+): 4.6

## 2021-04-07 ENCOUNTER — TELEPHONE (OUTPATIENT)
Dept: FAMILY MEDICINE CLINIC | Age: 61
End: 2021-04-07

## 2021-04-07 NOTE — TELEPHONE ENCOUNTER
We received orders from [facility] for Dr. Katya Mora. Selena Mcgee from [facility] to inform her that patient hasn't been seen in our office since 7/17/20. Also, patient saw Dr. Elsie Gaming who is no longer with our practice. Patient will have to establish with one of our nurse practitioners accepting new patients to have orders signed. Orders scanned into media.

## 2021-04-09 ENCOUNTER — TELEPHONE (OUTPATIENT)
Dept: FAMILY MEDICINE CLINIC | Age: 61
End: 2021-04-09

## 2021-04-09 NOTE — TELEPHONE ENCOUNTER
Pt last seen 7/17/20 by Alexander Segura. Pt has not est care with a new provider yet.  Do you want to do the verbal orders before seeing pt or does she need to be seen in office by someone first?

## 2021-04-09 NOTE — TELEPHONE ENCOUNTER
Andrew Chen from [facility] is calling to state the patient is starting 1 Monica Rodriguez, PT and OT. Мария Service for verbal orders?        Andrew Chen [phone]

## 2021-04-19 ENCOUNTER — OFFICE VISIT (OUTPATIENT)
Dept: FAMILY MEDICINE CLINIC | Age: 61
End: 2021-04-19
Payer: COMMERCIAL

## 2021-04-19 VITALS
HEIGHT: 62 IN | DIASTOLIC BLOOD PRESSURE: 60 MMHG | HEART RATE: 84 BPM | OXYGEN SATURATION: 98 % | BODY MASS INDEX: 25.1 KG/M2 | WEIGHT: 136.4 LBS | SYSTOLIC BLOOD PRESSURE: 100 MMHG

## 2021-04-19 DIAGNOSIS — E11.22 TYPE 2 DIABETES MELLITUS WITH STAGE 3 CHRONIC KIDNEY DISEASE, WITH LONG-TERM CURRENT USE OF INSULIN, UNSPECIFIED WHETHER STAGE 3A OR 3B CKD (HCC): Primary | ICD-10-CM

## 2021-04-19 DIAGNOSIS — Z79.4 TYPE 2 DIABETES MELLITUS WITH STAGE 3 CHRONIC KIDNEY DISEASE, WITH LONG-TERM CURRENT USE OF INSULIN, UNSPECIFIED WHETHER STAGE 3A OR 3B CKD (HCC): Primary | ICD-10-CM

## 2021-04-19 DIAGNOSIS — Z76.89 ENCOUNTER TO ESTABLISH CARE: ICD-10-CM

## 2021-04-19 DIAGNOSIS — N18.30 TYPE 2 DIABETES MELLITUS WITH STAGE 3 CHRONIC KIDNEY DISEASE, WITH LONG-TERM CURRENT USE OF INSULIN, UNSPECIFIED WHETHER STAGE 3A OR 3B CKD (HCC): Primary | ICD-10-CM

## 2021-04-19 DIAGNOSIS — N18.30 STAGE 3 CHRONIC KIDNEY DISEASE, UNSPECIFIED WHETHER STAGE 3A OR 3B CKD (HCC): ICD-10-CM

## 2021-04-19 DIAGNOSIS — I10 ESSENTIAL HYPERTENSION: ICD-10-CM

## 2021-04-19 PROBLEM — E87.20 ACIDOSIS: Status: ACTIVE | Noted: 2020-07-27

## 2021-04-19 PROBLEM — D49.6 NEOPLASM OF UNSPECIFIED BEHAVIOR OF BRAIN (HCC): Status: ACTIVE | Noted: 2017-08-03

## 2021-04-19 PROBLEM — H04.123 DRY EYE SYNDROME OF BILATERAL LACRIMAL GLANDS: Status: ACTIVE | Noted: 2021-04-19

## 2021-04-19 PROBLEM — H35.81 RETINAL EDEMA: Status: ACTIVE | Noted: 2021-04-19

## 2021-04-19 PROBLEM — H52.13 MYOPIA OF BOTH EYES: Status: ACTIVE | Noted: 2021-04-19

## 2021-04-19 PROBLEM — N25.0 RENAL OSTEODYSTROPHY: Status: ACTIVE | Noted: 2020-07-27

## 2021-04-19 PROBLEM — N18.9 ANEMIA ASSOCIATED WITH CHRONIC RENAL FAILURE: Status: ACTIVE | Noted: 2020-07-27

## 2021-04-19 PROBLEM — H26.9 CATARACT: Status: ACTIVE | Noted: 2021-04-19

## 2021-04-19 PROBLEM — R19.7 DIARRHEA: Status: ACTIVE | Noted: 2020-07-27

## 2021-04-19 PROBLEM — H53.2 DIPLOPIA: Status: ACTIVE | Noted: 2021-04-19

## 2021-04-19 PROBLEM — H49.11 FOURTH NERVE PALSY OF RIGHT EYE: Status: ACTIVE | Noted: 2021-04-19

## 2021-04-19 PROBLEM — R31.9 HEMATURIA: Status: ACTIVE | Noted: 2020-07-27

## 2021-04-19 PROBLEM — E66.3 OVERWEIGHT: Status: ACTIVE | Noted: 2017-08-03

## 2021-04-19 PROBLEM — H25.042 POSTERIOR SUBCAPSULAR POLAR AGE-RELATED CATARACT, LEFT EYE: Status: ACTIVE | Noted: 2021-04-19

## 2021-04-19 PROBLEM — N28.9 RENAL IMPAIRMENT: Status: ACTIVE | Noted: 2017-08-03

## 2021-04-19 PROBLEM — D63.1 ANEMIA ASSOCIATED WITH CHRONIC RENAL FAILURE: Status: ACTIVE | Noted: 2020-07-27

## 2021-04-19 PROBLEM — E87.5 HYPERKALEMIA: Status: ACTIVE | Noted: 2020-07-27

## 2021-04-19 PROCEDURE — 99214 OFFICE O/P EST MOD 30 MIN: CPT | Performed by: NURSE PRACTITIONER

## 2021-04-19 RX ORDER — INSULIN LISPRO 100 [IU]/ML
INJECTION, SOLUTION INTRAVENOUS; SUBCUTANEOUS
COMMUNITY
Start: 2021-04-07

## 2021-04-19 ASSESSMENT — PATIENT HEALTH QUESTIONNAIRE - PHQ9
SUM OF ALL RESPONSES TO PHQ QUESTIONS 1-9: 0
2. FEELING DOWN, DEPRESSED OR HOPELESS: 0
SUM OF ALL RESPONSES TO PHQ9 QUESTIONS 1 & 2: 0

## 2021-04-19 NOTE — LETTER
[address]  Phone: [phone]  Fax: [phone]    LIDA Driver Arm, CNP        April 19, 2021     Patient: Rowena Winters   YOB: 1960   Date of Visit: 4/19/2021       To Whom It May Concern: It is my medical opinion that Leanna Lynn requires a disability parking placard for the following reasons:  She has limited walking ability due to a neurologic condition. Duration of need: 3 year    If you have any questions or concerns, please don't hesitate to call.     Sincerely,        LIDA Marx - CNP

## 2021-04-19 NOTE — PROGRESS NOTES
Patient: Rowena Winters is a 64 y.o. female who presents today with the following Chief Complaint(s):  Chief Complaint   Patient presents with    Established New Doctor     Previous JS Patient         HPI-this is a 59-year-old female establishing care with me today  She states that last month she encountered a brain bleed due to to a brain tumor. She went to the hospital at [facility] and had surgery. It was there they put in a shunt and she was discharged and went to [facility] for rehab. While in rehab she got sick again with meningitis she states and was transferred back to the hospital.  She was then rereleased and placed in [facility] for rehab again. She follows with neurology through . She also has an endocrinologist and a urologist/nephrologist due to her kidney disease. She does have a history of type 2 diabetes but states that her blood sugars have been good since she has been discharged and returned home. She states they run anywhere from 85 to 110 mg/dL. She currently is wearing an eye patch and she is rotating every couple of hours to each eye due to the diplopia that she is experiencing due to the above problem. She states that she has pending labs that were ordered from her urologist and she will get these done as soon as possible. She is needing a handicap placard and this will be written for her today. She finally feels that she is on the road to recovery right now. She states that she is active and walking daily.   Current Outpatient Medications   Medication Sig Dispense Refill    insulin lispro, 1 Unit Dial, (HUMALOG KWIKPEN) 100 UNIT/ML SOPN Use up to 15 units per day as directed      lisinopril (PRINIVIL;ZESTRIL) 20 MG tablet TAKE ONE TABLET BY MOUTH TWICE A DAY (Patient taking differently: Take 5 mg by mouth daily ) 180 tablet 3    allopurinol (ZYLOPRIM) 100 MG tablet daily      carvedilol (COREG) 12.5 MG tablet TAKE ONE TABLET BY MOUTH TWICE A  tablet 0    atorvastatin of motion. Feet:      Right foot:      Protective Sensation: 10 sites tested. 10 sites sensed. Skin integrity: Skin integrity normal.      Toenail Condition: Right toenails are normal.      Left foot:      Protective Sensation: 10 sites tested. 10 sites sensed. Skin integrity: Skin integrity normal.      Toenail Condition: Left toenails are normal.   Lymphadenopathy:      Cervical: No cervical adenopathy. Skin:     General: Skin is warm and dry. Neurological:      Mental Status: She is alert and oriented to person, place, and time. Psychiatric:         Mood and Affect: Mood normal.         Behavior: Behavior normal.         Thought Content: Thought content normal.         Judgment: Judgment normal.       Vitals:    04/19/21 1510   BP: 100/60   Pulse: 84   SpO2: 98%       Assessment:  Encounter Diagnoses   Name Primary?  Type 2 diabetes mellitus with stage 3 chronic kidney disease, with long-term current use of insulin, unspecified whether stage 3a or 3b CKD ([facility] [address] Yes    Encounter to establish care     Essential hypertension     Stage 3 chronic kidney disease, unspecified whether stage 3a or 3b CKD        Controlled SubstancesMonitoring:  NA    Plan:  1. Type 2 diabetes mellitus with stage 3 chronic kidney disease, with long-term current use of insulin, unspecified whether stage 3a or 3b CKD ([address]  Stable  - HM DIABETES FOOT EXAM  She will follow up with her endocrinologist  Continue diabetic medications as previously ordered by them    2. Encounter to establish care  Established    3. Essential hypertension  Stable  Continue lisinopril as previously directed  Continue Coreg and amlodipine as previously directed by MD nephrologist    4. Stage 3 chronic kidney disease, unspecified whether stage 3a or 3b CKD  Stable  Follow-up with urologist/nephrologist      EDWIGE Tran    Reviewed treatment plan with patient.   Patient verbalized understanding to treatment plan and questions were

## 2021-05-04 ENCOUNTER — TELEPHONE (OUTPATIENT)
Dept: FAMILY MEDICINE CLINIC | Age: 61
End: 2021-05-04
Payer: COMMERCIAL

## 2021-05-04 DIAGNOSIS — G91.9 HYDROCEPHALUS, UNSPECIFIED TYPE (HCC): Primary | ICD-10-CM

## 2021-05-04 PROCEDURE — G0180 MD CERTIFICATION HHA PATIENT: HCPCS | Performed by: FAMILY MEDICINE

## 2021-05-24 LAB — DIABETIC RETINOPATHY: POSITIVE
